# Patient Record
Sex: MALE | Race: WHITE
[De-identification: names, ages, dates, MRNs, and addresses within clinical notes are randomized per-mention and may not be internally consistent; named-entity substitution may affect disease eponyms.]

---

## 2018-06-07 ENCOUNTER — HOSPITAL ENCOUNTER (OUTPATIENT)
Dept: HOSPITAL 47 - RADXRMAIN | Age: 83
Discharge: HOME | End: 2018-06-07
Attending: FAMILY MEDICINE
Payer: MEDICARE

## 2018-06-07 DIAGNOSIS — S49.91XA: Primary | ICD-10-CM

## 2018-06-07 NOTE — XR
EXAMINATION TYPE: XR shoulder complete RT

 

DATE OF EXAM: 6/7/2018

 

COMPARISON: NONE

 

HISTORY: Pain

 

TECHNIQUE:  Shoulder examined in 3 views

 

FINDINGS: 

The humeral head articulates with the glenoid.

The acromio-clavicular junction is normal.

No acute fractures or dislocations are evident.

 

A follow up study can be performed 7-10 days from acute trauma for continued pain.

 

IMPRESSION:

1. Normal right Shoulder .

2. A 0.9 cm nodule may be in the right upper lobe rate follow-up chest study is recommended.

## 2021-01-29 ENCOUNTER — HOSPITAL ENCOUNTER (INPATIENT)
Dept: HOSPITAL 47 - 2SICU | Age: 86
LOS: 9 days | DRG: 871 | End: 2021-02-07
Attending: INTERNAL MEDICINE | Admitting: INTERNAL MEDICINE
Payer: MEDICARE

## 2021-01-29 VITALS — BODY MASS INDEX: 23.8 KG/M2

## 2021-01-29 DIAGNOSIS — A41.89: Primary | ICD-10-CM

## 2021-01-29 DIAGNOSIS — M19.90: ICD-10-CM

## 2021-01-29 DIAGNOSIS — R13.10: ICD-10-CM

## 2021-01-29 DIAGNOSIS — Z86.73: ICD-10-CM

## 2021-01-29 DIAGNOSIS — Z51.5: ICD-10-CM

## 2021-01-29 DIAGNOSIS — Z90.2: ICD-10-CM

## 2021-01-29 DIAGNOSIS — H91.90: ICD-10-CM

## 2021-01-29 DIAGNOSIS — J96.01: ICD-10-CM

## 2021-01-29 DIAGNOSIS — R45.1: ICD-10-CM

## 2021-01-29 DIAGNOSIS — Z87.891: ICD-10-CM

## 2021-01-29 DIAGNOSIS — N17.0: ICD-10-CM

## 2021-01-29 DIAGNOSIS — Z79.82: ICD-10-CM

## 2021-01-29 DIAGNOSIS — G40.909: ICD-10-CM

## 2021-01-29 DIAGNOSIS — Z95.1: ICD-10-CM

## 2021-01-29 DIAGNOSIS — E86.0: ICD-10-CM

## 2021-01-29 DIAGNOSIS — G93.41: ICD-10-CM

## 2021-01-29 DIAGNOSIS — F32.9: ICD-10-CM

## 2021-01-29 DIAGNOSIS — Z79.899: ICD-10-CM

## 2021-01-29 DIAGNOSIS — J12.82: ICD-10-CM

## 2021-01-29 DIAGNOSIS — Z74.01: ICD-10-CM

## 2021-01-29 DIAGNOSIS — Z79.02: ICD-10-CM

## 2021-01-29 DIAGNOSIS — Z78.1: ICD-10-CM

## 2021-01-29 DIAGNOSIS — Z66: ICD-10-CM

## 2021-01-29 DIAGNOSIS — I25.10: ICD-10-CM

## 2021-01-29 DIAGNOSIS — K21.9: ICD-10-CM

## 2021-01-29 DIAGNOSIS — J45.20: ICD-10-CM

## 2021-01-29 DIAGNOSIS — I10: ICD-10-CM

## 2021-01-29 DIAGNOSIS — R19.7: ICD-10-CM

## 2021-01-29 DIAGNOSIS — E78.5: ICD-10-CM

## 2021-01-29 DIAGNOSIS — Z95.5: ICD-10-CM

## 2021-01-29 DIAGNOSIS — Z98.49: ICD-10-CM

## 2021-01-29 DIAGNOSIS — R64: ICD-10-CM

## 2021-01-29 DIAGNOSIS — U07.1: ICD-10-CM

## 2021-01-29 PROCEDURE — 5A0945A ASSISTANCE WITH RESPIRATORY VENTILATION, 24-96 CONSECUTIVE HOURS, HIGH NASAL FLOW/VELOCITY: ICD-10-PCS

## 2021-01-29 PROCEDURE — 85025 COMPLETE CBC W/AUTO DIFF WBC: CPT

## 2021-01-29 PROCEDURE — 83615 LACTATE (LD) (LDH) ENZYME: CPT

## 2021-01-29 PROCEDURE — 83880 ASSAY OF NATRIURETIC PEPTIDE: CPT

## 2021-01-29 PROCEDURE — 86140 C-REACTIVE PROTEIN: CPT

## 2021-01-29 PROCEDURE — 87324 CLOSTRIDIUM AG IA: CPT

## 2021-01-29 PROCEDURE — 94640 AIRWAY INHALATION TREATMENT: CPT

## 2021-01-29 PROCEDURE — 81001 URINALYSIS AUTO W/SCOPE: CPT

## 2021-01-29 PROCEDURE — 82728 ASSAY OF FERRITIN: CPT

## 2021-01-29 PROCEDURE — 85379 FIBRIN DEGRADATION QUANT: CPT

## 2021-01-29 PROCEDURE — 83735 ASSAY OF MAGNESIUM: CPT

## 2021-01-29 PROCEDURE — 80048 BASIC METABOLIC PNL TOTAL CA: CPT

## 2021-01-29 PROCEDURE — 71045 X-RAY EXAM CHEST 1 VIEW: CPT

## 2021-01-30 LAB
ANION GAP SERPL CALC-SCNC: 8 MMOL/L
BASOPHILS # BLD AUTO: 0 K/UL (ref 0–0.2)
BASOPHILS NFR BLD AUTO: 0 %
BUN SERPL-SCNC: 41 MG/DL (ref 9–20)
CALCIUM SPEC-MCNC: 8.4 MG/DL (ref 8.4–10.2)
CHLORIDE SERPL-SCNC: 106 MMOL/L (ref 98–107)
CO2 SERPL-SCNC: 24 MMOL/L (ref 22–30)
EOSINOPHIL # BLD AUTO: 0 K/UL (ref 0–0.7)
EOSINOPHIL NFR BLD AUTO: 0 %
ERYTHROCYTE [DISTWIDTH] IN BLOOD BY AUTOMATED COUNT: 4.19 M/UL (ref 4.3–5.9)
ERYTHROCYTE [DISTWIDTH] IN BLOOD: 13.7 % (ref 11.5–15.5)
GLUCOSE SERPL-MCNC: 185 MG/DL (ref 74–99)
HCT VFR BLD AUTO: 37 % (ref 39–53)
HGB BLD-MCNC: 12 GM/DL (ref 13–17.5)
LDH SPEC-CCNC: 1229 U/L (ref 313–618)
LYMPHOCYTES # SPEC AUTO: 0.7 K/UL (ref 1–4.8)
LYMPHOCYTES NFR SPEC AUTO: 7 %
MAGNESIUM SPEC-SCNC: 2.8 MG/DL (ref 1.6–2.3)
MCH RBC QN AUTO: 28.7 PG (ref 25–35)
MCHC RBC AUTO-ENTMCNC: 32.6 G/DL (ref 31–37)
MCV RBC AUTO: 88.2 FL (ref 80–100)
MONOCYTES # BLD AUTO: 0.5 K/UL (ref 0–1)
MONOCYTES NFR BLD AUTO: 5 %
NEUTROPHILS # BLD AUTO: 8.4 K/UL (ref 1.3–7.7)
NEUTROPHILS NFR BLD AUTO: 86 %
PH UR: 6 [PH] (ref 5–8)
PLATELET # BLD AUTO: 236 K/UL (ref 150–450)
POTASSIUM SERPL-SCNC: 5 MMOL/L (ref 3.5–5.1)
RBC UR QL: 15 /HPF (ref 0–5)
SODIUM SERPL-SCNC: 138 MMOL/L (ref 137–145)
SP GR UR: 1.01 (ref 1–1.03)
UROBILINOGEN UR QL STRIP: <2 MG/DL (ref ?–2)
WBC # BLD AUTO: 9.9 K/UL (ref 3.8–10.6)
WBC # UR AUTO: <1 /HPF (ref 0–5)

## 2021-01-30 RX ADMIN — TAMSULOSIN HYDROCHLORIDE SCH MG: 0.4 CAPSULE ORAL at 10:10

## 2021-01-30 RX ADMIN — ALBUTEROL SULFATE PRN PUFF: 90 AEROSOL, METERED RESPIRATORY (INHALATION) at 19:49

## 2021-01-30 RX ADMIN — CLOPIDOGREL BISULFATE SCH MG: 75 TABLET ORAL at 10:10

## 2021-01-30 RX ADMIN — ENOXAPARIN SODIUM SCH MG: 40 INJECTION SUBCUTANEOUS at 08:30

## 2021-01-30 RX ADMIN — ISOSORBIDE MONONITRATE SCH MG: 30 TABLET, EXTENDED RELEASE ORAL at 10:09

## 2021-01-30 RX ADMIN — ASPIRIN 81 MG CHEWABLE TABLET SCH MG: 81 TABLET CHEWABLE at 10:10

## 2021-01-30 RX ADMIN — COLCHICINE SCH MG: 0.6 TABLET, FILM COATED ORAL at 08:31

## 2021-01-30 RX ADMIN — VENLAFAXINE HYDROCHLORIDE SCH MG: 150 CAPSULE, EXTENDED RELEASE ORAL at 10:09

## 2021-01-30 RX ADMIN — Medication SCH MG: at 10:10

## 2021-01-30 NOTE — P.HPIM
History of Present Illness


86-year-old the vanessa gentleman was transferred from [facility] for Covid 19 and treatment with Actemra, which is unfortunately not 

available at this time and this hospital.  Patient doesn't have any more fevers 

patient is on 15 L of high flow nasal cannula oxygen patient has bilateral 

rhonchi.  Patient is not a candidate for Remdesivir but patient is on Decadron, 

colchicine, zinc and ascorbic acid.  Patient denied any fever chills nausea 

vomiting abdominal pain dysuria.





Review of Systems








REVIEW OF SYSTEMS: 


CONSTITUTIONAL: No fever, no malaise, no fatigue. 


HEENT: No recent visual problems or hearing problems. Denied any sore throat. 


CARDIOVASCULAR: No chest pain, orthopnea, PND, no palpitations, no syncope. 


PULMONARY: no hemoptysis. 


GASTROINTESTINAL: No diarrhea, no nausea, no vomiting, no abdominal pain. 


NEUROLOGICAL: No headaches, no weakness, no numbness. 


HEMATOLOGICAL: Denies any bleeding or petechiae. 


GENITOURINARY: Denies any burning micturition, frequency, or urgency. 


MUSCULOSKELETAL/RHEUMATOLOGICAL: Denies any joint pain, swelling, or any muscle 

pain. 


ENDOCRINE: Denies any polyuria or polydipsia. 





The rest of the 14-point review of systems is negative.











Past Medical History


Past Medical History: Coronary Artery Disease (CAD), CVA/TIA, GERD/Reflux, 

Hearing Disorder / Deafness, Hyperlipidemia, Hypertension, Osteoarthritis (OA)


History of Any Multi-Drug Resistant Organisms: None Reported


Past Surgical History: Coronary Bypass/CABG, Heart Catheterization With Stent, 

Hernia Repair


Additional Past Surgical History / Comment(s): R lung lobectomy (1978), 

Cataracts, Colonoscopy with polyp removal, Angioplasty with stent (1996,2005), 

Brain surgery (2012)


Date of Last Stent Placement:: 2005


Smoking Status: Former smoker





Medications and Allergies


                                Home Medications











 Medication  Instructions  Recorded  Confirmed  Type


 


Acetaminophen Tab [Tylenol] 650 mg PO Q4H PRN 01/29/21 01/29/21 History


 


Albuterol Inhaler [Ventolin Hfa 2 puff INHALATION RT-Q4H PRN 01/29/21 01/29/21 

History





Inhaler]    


 


Ascorbic Acid [Vitamin C] 2,000 mg PO DAILY 01/29/21 01/29/21 History


 


Aspirin EC [Ecotrin Low Dose] 81 mg PO DAILY 01/29/21 01/29/21 History


 


Atorvastatin Calcium [Lipitor] 10 mg PO HS 01/29/21 01/29/21 History


 


Cholecalciferol [Vitamin D3 (25 100 mcg PO DAILY 01/29/21 01/29/21 History





Mcg = 1000 Iu)]    


 


Clopidogrel Bisulfate [Plavix] 75 mg PO DAILY 01/29/21 01/29/21 History


 


Colchicine 0.6 mg PO BID 01/29/21 01/29/21 History


 


Enoxaparin [Lovenox] 40 mg SQ DAILY 01/29/21 01/29/21 History


 


Ezetimibe [Zetia] 10 mg PO HS 01/29/21 01/29/21 History


 


Famotidine [Pepcid] 20 mg PO DAILY 01/29/21 01/29/21 History


 


Isosorbide Mononitrate ER [Imdur] 90 mg PO DAILY 01/29/21 01/29/21 History


 


Melatonin 5 mg PO HS 01/29/21 01/29/21 History


 


Montelukast [Singulair] 10 mg PO HS 01/29/21 01/29/21 History


 


Sodium Chloride [Saline Nasal 1 spray EA NOSTRIL BID 01/29/21 01/29/21 History





Spray]    


 


Tamsulosin [Flomax] 0.4 mg PO BID 01/29/21 01/29/21 History


 


Thiamine [Vitamin B-1] 100 mg PO BID 01/29/21 01/29/21 History


 


Venlafaxine HCl ER [Effexor Xr] 150 mg PO DAILY 01/29/21 01/29/21 History


 


Zinc Sulfate 220 mg PO AC-BRKFST 01/29/21 01/29/21 History


 


carvediloL [Coreg] 3.125 mg PO BID 01/29/21 01/29/21 History


 


levETIRAcetam [Keppra] 500 mg PO Q12H 01/29/21 01/29/21 History


 


lisinopriL [Zestril] 5 mg PO DAILY 01/29/21 01/29/21 History








                                    Allergies











Allergy/AdvReac Type Severity Reaction Status Date / Time


 


No Known Allergies Allergy   Unverified 01/29/21 21:05














Physical Exam


Vitals: 


                                   Vital Signs











  Temp Pulse Resp BP BP Pulse Ox


 


 01/30/21 08:00  98 F  51 L  22  155/66   98


 


 01/30/21 07:00   57 L  24  156/65   93 L


 


 01/30/21 06:00   50 L  36 H  136/55   96


 


 01/30/21 05:00   59 L  17  155/74   94 L


 


 01/30/21 04:00  98.8 F  57 L  23  139/54   97


 


 01/30/21 03:29       97


 


 01/30/21 03:00   51 L  22  148/60   97


 


 01/30/21 02:00   49 L  28 H  139/64   95


 


 01/30/21 01:00   54 L  13    91 L


 


 01/30/21 00:00  97.7 F  66  48 H  154/66   97


 


 01/29/21 23:00   59 L  25 H    88 L


 


 01/29/21 22:20   61  33 H  143/57   80 L


 


 01/29/21 20:36       95


 


 01/29/21 20:35  98.3 F   15   154/66  92 L








                                Intake and Output











 01/29/21 01/30/21 01/30/21





 22:59 06:59 14:59


 


Intake Total 0 0 0


 


Output Total  550 300


 


Balance 0 -550 -300


 


Intake:   


 


  IV 0 0 0


 


    0.9 0 0 0


 


Output:   


 


  Urine  550 300


 


Other:   


 


  Weight 72.6 kg 72.6 kg 

















PHYSICAL EXAMINATION: 





GENERAL: The patient is alert and oriented x3, not in any acute distress. Well 

developed, well nourished. 


HEENT: Pupils are round and equally reacting to light. EOMI. No scleral icterus.

 No conjunctival pallor. Normocephalic, atraumatic. No pharyngeal erythema. No 

thyromegaly. 


CARDIOVASCULAR: S1 and S2 present. No murmurs, rubs, or gallops. 


PULMONARY: Diffuse bilateral rhonchi no wheezing was appreciated.


ABDOMEN: Soft, nontender, nondistended, normoactive bowel sounds. No palpable 

organomegaly. 


MUSCULOSKELETAL: No joint swelling or deformity.


EXTREMITIES: No cyanosis, clubbing, or pedal edema. 


NEUROLOGICAL: Gross neurological examination did not reveal any focal deficits. 


SKIN: No rashes. 

















Results


CBC & Chem 7: 


                                 01/30/21 03:45





                                 01/30/21 03:45


Labs: 


                  Abnormal Lab Results - Last 24 Hours (Table)











  01/30/21 01/30/21 01/30/21 Range/Units





  03:45 03:45 03:45 


 


RBC  4.19 L    (4.30-5.90)  m/uL


 


Hgb  12.0 L    (13.0-17.5)  gm/dL


 


Hct  37.0 L    (39.0-53.0)  %


 


Neutrophils #  8.4 H    (1.3-7.7)  k/uL


 


Lymphocytes #  0.7 L    (1.0-4.8)  k/uL


 


D-Dimer     (<0.60)  mg/L FEU


 


BUN   41 H   (9-20)  mg/dL


 


Glucose   185 H   (74-99)  mg/dL


 


Magnesium   2.8 H   (1.6-2.3)  mg/dL


 


Lactate Dehydrogenase    1229 H  (313-618)  U/L


 


C-Reactive Protein    27.0 H  (<10.0)  mg/L














  01/30/21 Range/Units





  03:45 


 


RBC   (4.30-5.90)  m/uL


 


Hgb   (13.0-17.5)  gm/dL


 


Hct   (39.0-53.0)  %


 


Neutrophils #   (1.3-7.7)  k/uL


 


Lymphocytes #   (1.0-4.8)  k/uL


 


D-Dimer  1.06 H  (<0.60)  mg/L FEU


 


BUN   (9-20)  mg/dL


 


Glucose   (74-99)  mg/dL


 


Magnesium   (1.6-2.3)  mg/dL


 


Lactate Dehydrogenase   (313-618)  U/L


 


C-Reactive Protein   (<10.0)  mg/L














Thrombosis Risk Factor Assmnt





- Choose All That Apply


Each Risk Factor Represents 3 Points: Age 75 years or older


Thrombosis Risk Factor Assessment Total Risk Factor Score: 3


Thrombosis Risk Factor Assessment Level: Moderate Risk





Assessment and Plan


Plan: 


-Acute respiratory failure secondary to covid 19 pneumonia and patient continue 

with the above-mentioned medications that his Decadron, colchicine, zinc oxide. 

 Supportive care continue with oxygen as needed.  Monitoring.  Arcus.


-Coronary artery disease status post coronary artery bypass grafting


-CVA/TIA in the past 


- gastroesophageal reflux disease and stent-hyperlipidemia


-Hypertension


-Hyperlipidemia


Depression.


-Seizure disorder for which patient and Keppra which will be continued


-DVT prophylaxis with Lovenox

## 2021-01-30 NOTE — P.CNPUL
History of Present Illness


Consult date: 01/30/21


Requesting physician: Thais Hayward


Reason for consult: other (Acute hypoxic respiratory failure secondary to covid 

19 pneumonitis.)


Chief complaint: Shortness of breath


History of present illness: 





This is a 86-year-old white male with history of multiple medical problems 

including benign essential hypertension, coronary artery disease, GERD, obesity,

chronic degenerative joint disease, patient had been sick since January 18 2021.

 Symptoms were mostly symptoms of weakness, no energy, on January 19, patient 

was diagnosed with positive Covid  19 infection..  Patient was admitted to [facility] few days ago, and he was seen by Dr. cortes on consultation.  

He felt the patient was out of the window for remdesivir, and he recommended 

that the patient gets at least actemra .  Patient was seen by infectious disease

on consultation at [facility], and he was started on the Covid 19 

cocktail along with remdesivir.  However based on the recommendation by Dr. Cortes, arrangements were made to transfer the patient to [facility] late last night, hoping to receive actemra, patient is presently on 15 

L high flow nasal cannula.  Feels comfortable, in no distress, denies any cough,

no wheezing, denies even shortness of breath.  Denies any headache, no nausea no

vomiting, no loss of sensation of taste or smell.  Patient was transferred to 

the ICU mostly because he is requiring a significant amount of oxygen, and his 

chest x-ray clearly shows evidence of diffuse interstitial pneumonitis.  Left 

more so than right.  His CBC is relatively normal his d-dimer is 1.06.  His 

electrolytes are normal his LDH is 1229, and C-reactive protein is 27





Review of Systems


CONSTITUTIONAL: No fever, no malaise, no fatigue. 


HEENT: Negative


CARDIOVASCULAR: No chest pain, orthopnea, PND, no palpitations, no syncope. 


PULMONARY: As noted in HPI.


GASTROINTESTINAL: No diarrhea, no nausea, no vomiting, no abdominal pain. 


NEUROLOGICAL: No headaches, no weakness, no numbness. 


HEMATOLOGICAL: Denies any bleeding or petechiae. 


GENITOURINARY: Denies any burning micturition, frequency, or urgency. 


MUSCULOSKELETAL/RHEUMATOLOGICAL: Denies any joint pain, swelling, or any muscle 

pain. 


ENDOCRINE: Denies any polyuria or polydipsia. 


Skin: Negative


Psychiatric: Negative











Past Medical History


Past Medical History: Coronary Artery Disease (CAD), CVA/TIA, GERD/Reflux, 

Hearing Disorder / Deafness, Hyperlipidemia, Hypertension, Osteoarthritis (OA)


History of Any Multi-Drug Resistant Organisms: None Reported


Past Surgical History: Coronary Bypass/CABG, Heart Catheterization With Stent, 

Hernia Repair


Additional Past Surgical History / Comment(s): R lung lobectomy (1978), 

Cataracts, Colonoscopy with polyp removal, Angioplasty with stent (1996,2005), 

Brain surgery (2012)


Date of Last Stent Placement:: 2005


Smoking Status: Former smoker





Medications and Allergies


                                Home Medications











 Medication  Instructions  Recorded  Confirmed  Type


 


Acetaminophen Tab [Tylenol] 650 mg PO Q4H PRN 01/29/21 01/29/21 History


 


Albuterol Inhaler [Ventolin Hfa 2 puff INHALATION RT-Q4H PRN 01/29/21 01/29/21 

History





Inhaler]    


 


Ascorbic Acid [Vitamin C] 2,000 mg PO DAILY 01/29/21 01/29/21 History


 


Aspirin EC [Ecotrin Low Dose] 81 mg PO DAILY 01/29/21 01/29/21 History


 


Atorvastatin Calcium [Lipitor] 10 mg PO HS 01/29/21 01/29/21 History


 


Cholecalciferol [Vitamin D3 (25 100 mcg PO DAILY 01/29/21 01/29/21 History





Mcg = 1000 Iu)]    


 


Clopidogrel Bisulfate [Plavix] 75 mg PO DAILY 01/29/21 01/29/21 History


 


Colchicine 0.6 mg PO BID 01/29/21 01/29/21 History


 


Enoxaparin [Lovenox] 40 mg SQ DAILY 01/29/21 01/29/21 History


 


Ezetimibe [Zetia] 10 mg PO HS 01/29/21 01/29/21 History


 


Famotidine [Pepcid] 20 mg PO DAILY 01/29/21 01/29/21 History


 


Isosorbide Mononitrate ER [Imdur] 90 mg PO DAILY 01/29/21 01/29/21 History


 


Melatonin 5 mg PO HS 01/29/21 01/29/21 History


 


Montelukast [Singulair] 10 mg PO HS 01/29/21 01/29/21 History


 


Sodium Chloride [Saline Nasal 1 spray EA NOSTRIL BID 01/29/21 01/29/21 History





Spray]    


 


Tamsulosin [Flomax] 0.4 mg PO BID 01/29/21 01/29/21 History


 


Thiamine [Vitamin B-1] 100 mg PO BID 01/29/21 01/29/21 History


 


Venlafaxine HCl ER [Effexor Xr] 150 mg PO DAILY 01/29/21 01/29/21 History


 


Zinc Sulfate 220 mg PO AC-BRKFST 01/29/21 01/29/21 History


 


carvediloL [Coreg] 3.125 mg PO BID 01/29/21 01/29/21 History


 


levETIRAcetam [Keppra] 500 mg PO Q12H 01/29/21 01/29/21 History


 


lisinopriL [Zestril] 5 mg PO DAILY 01/29/21 01/29/21 History








                                    Allergies











Allergy/AdvReac Type Severity Reaction Status Date / Time


 


No Known Allergies Allergy   Unverified 01/29/21 21:05














Physical Exam


Vitals: 


                                   Vital Signs











  Temp Pulse Resp BP BP Pulse Ox


 


 01/30/21 11:00   57 L  33 H  163/72   93 L


 


 01/30/21 10:00   54 L  16  170/62   92 L


 


 01/30/21 09:00   56 L  24  178/68   93 L


 


 01/30/21 08:00  98 F  51 L  22  155/66   98


 


 01/30/21 07:00   57 L  24  156/65   93 L


 


 01/30/21 06:00   50 L  36 H  136/55   96


 


 01/30/21 05:00   59 L  17  155/74   94 L


 


 01/30/21 04:00  98.8 F  57 L  23  139/54   97


 


 01/30/21 03:29       97


 


 01/30/21 03:00   51 L  22  148/60   97


 


 01/30/21 02:00   49 L  28 H  139/64   95


 


 01/30/21 01:00   54 L  13    91 L


 


 01/30/21 00:00  97.7 F  66  48 H  154/66   97


 


 01/29/21 23:00   59 L  25 H    88 L


 


 01/29/21 22:20   61  33 H  143/57   80 L


 


 01/29/21 20:36       95


 


 01/29/21 20:35  98.3 F   15   154/66  92 L








                                Intake and Output











 01/29/21 01/30/21 01/30/21





 22:59 06:59 14:59


 


Intake Total 0 0 0


 


Output Total  550 1325


 


Balance 0 -550 -1325


 


Intake:   


 


  IV 0 0 0


 


    0.9 0 0 0


 


Output:   


 


  Urine  550 1325


 


Other:   


 


  Voiding Method   Indwelling Catheter


 


  Weight 72.6 kg 72.6 kg 














Physical Exam: Revealed 86-year-old white male in no distress, on 15 L high flow

nasal cannula.


Head: Atraumatic, normocephalic.


HEENT:[Neck is supple.] [No neck masses.] [No thyromegaly.] [No JVD.]


Chest: [Symmetrical chest expansion, crackles at the bases.  No rhonchi and no 

wheezes.


Cardiac Exam: [Normal S1 and S2, no S3 gallop, no murmur.]


Abdomen: [Soft, nontender,  no megaly, no rebound, no guarding, normal bowel 

sounds.]


Extremities: [No clubbing, no edema, no cyanosis.]


Neurological Exam: [No focal neurologic deficit.]  Alert oriented 3, hard of 

hearing.


Psychiatric: Normal mood affect and normal mental status examination.


Skin: No rashes.





Results





- Laboratory Findings


CBC and BMP: 


                                 01/30/21 03:45





                                 01/30/21 03:45


PT/INR, D-dimer











D-Dimer  1.06 mg/L FEU (<0.60)  H  01/30/21  03:45    








Abnormal lab findings: 


                                  Abnormal Labs











  01/30/21 01/30/21 01/30/21





  03:45 03:45 03:45


 


RBC  4.19 L  


 


Hgb  12.0 L  


 


Hct  37.0 L  


 


Neutrophils #  8.4 H  


 


Lymphocytes #  0.7 L  


 


D-Dimer   


 


BUN   41 H 


 


Glucose   185 H 


 


Magnesium   2.8 H 


 


Lactate Dehydrogenase    1229 H


 


C-Reactive Protein    27.0 H


 


Urine Blood   


 


Urine RBC   


 


Urine Bacteria   


 


Urine Mucus   














  01/30/21 01/30/21





  03:45 09:25


 


RBC  


 


Hgb  


 


Hct  


 


Neutrophils #  


 


Lymphocytes #  


 


D-Dimer  1.06 H 


 


BUN  


 


Glucose  


 


Magnesium  


 


Lactate Dehydrogenase  


 


C-Reactive Protein  


 


Urine Blood   Moderate H


 


Urine RBC   15 H


 


Urine Bacteria   Rare H


 


Urine Mucus   Rare H














- Diagnostic Findings


Chest x-ray: image reviewed (As noted in HPI.)





Assessment and Plan


Assessment: 





Impression:


Acute hypoxic respiratory failure secondary to acute covid 19 pneumonitis.


History of TIA.


History of coronary artery disease and previous CABG.


History of degenerative joint disease.


Dyslipidemia.


GERD without esophagitis.





Recommendation:


Continue present supportive care measures.


Continue droplet isolation precautions.


Titrate oxygen and maintaining O2 saturation above 90%.


Infectious disease to see her on consultation.


Continue the Covid 19 cocktail.


Added actemra, 2 doses, 12 hours apart.


Resume home meds.


GI and DVT prophylaxis.


Added colchicine.


We will continue to follow


Prognosis is definitely guarded


Time with Patient: Greater than 30

## 2021-01-30 NOTE — XR
EXAMINATION TYPE: XR chest 1V

 

DATE OF EXAM: 1/30/2021

 

COMPARISON: NONE

 

HISTORY: Covid pneumonia

 

TECHNIQUE: Single frontal view of the chest is obtained.

 

FINDINGS:  Patient is rotated, exam is expiratory. Patient is post median sternotomy and the right he
midiaphragm is elevated. Bilateral airspace disease is noted. No evident pneumothorax. Difficult to e
xclude effusion. Cardiac mediastinal silhouette is within normal limits accounting for rotation, tech
nique.

 

IMPRESSION:  Correlate for pneumonia, edema

## 2021-01-30 NOTE — CONS
CONSULTATION



DATE OF SERVICE:

01/30/2021.



REASON FOR CONSULTATION:

COVID-19 pneumonia



HISTORY OF PRESENT ILLNESS:

The patient is an 86-year-old  male who initially presented to [facility] with increasing shortness of breath and cough.  The patient's symptoms have

been going on for more than 10 days and was previously diagnosed with COVID-19

infection.  The patient, while in that facility was noticed to have significant

worsening and 24 hours with his O2 requirement jumping up to 15 L and x-rays did shows

significant worsening of infiltrate with concern for the cytochrome [place].  The patient

was supposed to go to [facility] for Actemra infusion.  However, the

patient has been transferred to [facility].  The patient was brought

in last night.  Actemra has been ordered.  Unfortunately per the nursing staff there is

none available at this facility.  The patient is currently hemodynamically stable on

pressor support.  He is currently 98% on 15 L high-flow oxygen.  No vomiting or

diarrhea or any other changes reported by the nursing staff.  Patient himself is hard

of hearing and not a good historian.



REVIEW OF SYSTEMS:

Positive points have been mentioned in HPI. Other  systems are negative.



PAST MEDICAL HISTORY:

Coronary artery disease, gastroesophageal reflux disease, hypertension, hyperlipidemia.



PAST SURGICAL HISTORY:

Coronary artery bypass grafting, right lobectomy, cataract surgery.



SOCIAL HISTORY:

Remote history of smoking.  No drinking, drug use.



FAMILY HISTORY:

No pertinent findings noticed.



ALLERGIES:

No known drug allergies.



MEDICATIONS:

The patient is currently on Tylenol, vitamin C, aspirin, Lipitor, Coreg, vitamin D3,

Plavix, colchicine, dexamethasone, Lovenox, Pepcid, Imdur, Keppra, Zestril, Melatonin,

_____Effexor and zinc sulfate.



PHYSICAL EXAMINATION:

On examination, blood pressure 132/65 with a pulse of 64, temperature 98.  He is 90% on

15 L high-flow oxygen.

General description is an elderly male lying in bed in no distress.

HEENT examination: No pallor. No scleral icterus.

LUNGS: Unlabored breathing, decreased intensity in breath sounds.  No wheeze.

HEART S1, S2.  Regular rate and rhythm.

ABDOMEN:  Soft, no tenderness.

EXTREMITIES: No edema of the feet.



LABS:

Hemoglobin is 12.7, white count 9.9, BUN of 41, creatinine 0.89.  Urine has been

negative.  Chest x-ray with multifocal infiltrate.



DIAGNOSTIC IMPRESSION AND PLAN:

Patient admitted to the hospital with acute COVID-19 pneumonia in this patient

currently out of the therapeutic window for the Remdesivir with progressive worsening

of his infiltrate and concern for possible _____ the patient has been transferred out

from [facility] with hope of getting Actemra that may help this patient which

unfortunately not available at this facility.  Currently with no evidence of any

secondary bacterial pneumonia.



PLAN:

1. Recommend higher dose of steroids to help with inflammatory process.  We will

    switch him over to Solu-Medrol 60 q.6h.

2. If Actemra unavailable at this facility, recommend transferring the patient to [facility] where the patient will be able to receive this medication.

3. Continue with vitamin C, zinc, Lovenox.

4. We will follow on his clinical condition and further adjust medication if needed.

Thank you for this consultation. We will follow this patient along with you.





LILLY / DENTONN: [id number] / Job#: [id number]

## 2021-01-31 LAB
ANION GAP SERPL CALC-SCNC: 7 MMOL/L
BASOPHILS # BLD AUTO: 0.1 K/UL (ref 0–0.2)
BASOPHILS NFR BLD AUTO: 0 %
BUN SERPL-SCNC: 49 MG/DL (ref 9–20)
CALCIUM SPEC-MCNC: 8.7 MG/DL (ref 8.4–10.2)
CHLORIDE SERPL-SCNC: 106 MMOL/L (ref 98–107)
CO2 SERPL-SCNC: 26 MMOL/L (ref 22–30)
EOSINOPHIL # BLD AUTO: 0 K/UL (ref 0–0.7)
EOSINOPHIL NFR BLD AUTO: 0 %
ERYTHROCYTE [DISTWIDTH] IN BLOOD BY AUTOMATED COUNT: 4.58 M/UL (ref 4.3–5.9)
ERYTHROCYTE [DISTWIDTH] IN BLOOD: 13.5 % (ref 11.5–15.5)
GLUCOSE SERPL-MCNC: 162 MG/DL (ref 74–99)
HCT VFR BLD AUTO: 40.7 % (ref 39–53)
HGB BLD-MCNC: 13 GM/DL (ref 13–17.5)
LDH SPEC-CCNC: 1325 U/L (ref 313–618)
LYMPHOCYTES # SPEC AUTO: 0.8 K/UL (ref 1–4.8)
LYMPHOCYTES NFR SPEC AUTO: 7 %
MAGNESIUM SPEC-SCNC: 2.8 MG/DL (ref 1.6–2.3)
MCH RBC QN AUTO: 28.4 PG (ref 25–35)
MCHC RBC AUTO-ENTMCNC: 32 G/DL (ref 31–37)
MCV RBC AUTO: 88.8 FL (ref 80–100)
MONOCYTES # BLD AUTO: 0.9 K/UL (ref 0–1)
MONOCYTES NFR BLD AUTO: 8 %
NEUTROPHILS # BLD AUTO: 9.2 K/UL (ref 1.3–7.7)
NEUTROPHILS NFR BLD AUTO: 82 %
PLATELET # BLD AUTO: 258 K/UL (ref 150–450)
POTASSIUM SERPL-SCNC: 4.6 MMOL/L (ref 3.5–5.1)
SODIUM SERPL-SCNC: 139 MMOL/L (ref 137–145)
WBC # BLD AUTO: 11.2 K/UL (ref 3.8–10.6)

## 2021-01-31 RX ADMIN — Medication SCH MG: at 21:59

## 2021-01-31 RX ADMIN — Medication SCH MG: at 08:45

## 2021-01-31 RX ADMIN — ATORVASTATIN CALCIUM SCH MG: 10 TABLET, FILM COATED ORAL at 21:59

## 2021-01-31 RX ADMIN — Medication SCH MG: at 03:40

## 2021-01-31 RX ADMIN — Medication SCH MCG: at 08:45

## 2021-01-31 RX ADMIN — METHYLPREDNISOLONE SODIUM SUCCINATE SCH: 125 INJECTION, POWDER, FOR SOLUTION INTRAMUSCULAR; INTRAVENOUS at 15:36

## 2021-01-31 RX ADMIN — ISOSORBIDE MONONITRATE SCH MG: 30 TABLET, EXTENDED RELEASE ORAL at 08:44

## 2021-01-31 RX ADMIN — ALBUTEROL SULFATE PRN PUFF: 90 AEROSOL, METERED RESPIRATORY (INHALATION) at 19:49

## 2021-01-31 RX ADMIN — Medication SCH MG: at 08:44

## 2021-01-31 RX ADMIN — CLOPIDOGREL BISULFATE SCH MG: 75 TABLET ORAL at 08:44

## 2021-01-31 RX ADMIN — ALBUTEROL SULFATE PRN PUFF: 90 AEROSOL, METERED RESPIRATORY (INHALATION) at 15:29

## 2021-01-31 RX ADMIN — METHYLPREDNISOLONE SODIUM SUCCINATE SCH: 125 INJECTION, POWDER, FOR SOLUTION INTRAMUSCULAR; INTRAVENOUS at 07:07

## 2021-01-31 RX ADMIN — COLCHICINE SCH MG: 0.6 TABLET, FILM COATED ORAL at 08:45

## 2021-01-31 RX ADMIN — METHYLPREDNISOLONE SODIUM SUCCINATE SCH MG: 125 INJECTION, POWDER, FOR SOLUTION INTRAMUSCULAR; INTRAVENOUS at 03:41

## 2021-01-31 RX ADMIN — MONTELUKAST SODIUM SCH MG: 10 TABLET, FILM COATED ORAL at 03:40

## 2021-01-31 RX ADMIN — EZETIMIBE SCH: 10 TABLET ORAL at 03:15

## 2021-01-31 RX ADMIN — TAMSULOSIN HYDROCHLORIDE SCH MG: 0.4 CAPSULE ORAL at 08:44

## 2021-01-31 RX ADMIN — OXYCODONE HYDROCHLORIDE AND ACETAMINOPHEN SCH MG: 500 TABLET ORAL at 08:44

## 2021-01-31 RX ADMIN — ATORVASTATIN CALCIUM SCH: 10 TABLET, FILM COATED ORAL at 03:15

## 2021-01-31 RX ADMIN — COLCHICINE SCH MG: 0.6 TABLET, FILM COATED ORAL at 03:41

## 2021-01-31 RX ADMIN — ENOXAPARIN SODIUM SCH MG: 40 INJECTION SUBCUTANEOUS at 08:45

## 2021-01-31 RX ADMIN — ALBUTEROL SULFATE PRN PUFF: 90 AEROSOL, METERED RESPIRATORY (INHALATION) at 11:34

## 2021-01-31 RX ADMIN — METHYLPREDNISOLONE SODIUM SUCCINATE SCH MG: 125 INJECTION, POWDER, FOR SOLUTION INTRAMUSCULAR; INTRAVENOUS at 17:14

## 2021-01-31 RX ADMIN — Medication SCH: at 03:15

## 2021-01-31 RX ADMIN — EZETIMIBE SCH MG: 10 TABLET ORAL at 22:01

## 2021-01-31 RX ADMIN — TAMSULOSIN HYDROCHLORIDE SCH MG: 0.4 CAPSULE ORAL at 21:59

## 2021-01-31 RX ADMIN — TAMSULOSIN HYDROCHLORIDE SCH MG: 0.4 CAPSULE ORAL at 03:40

## 2021-01-31 RX ADMIN — MONTELUKAST SODIUM SCH MG: 10 TABLET, FILM COATED ORAL at 21:59

## 2021-01-31 RX ADMIN — FAMOTIDINE SCH MG: 20 TABLET, FILM COATED ORAL at 08:44

## 2021-01-31 RX ADMIN — ASPIRIN 81 MG CHEWABLE TABLET SCH MG: 81 TABLET CHEWABLE at 08:44

## 2021-01-31 RX ADMIN — VENLAFAXINE HYDROCHLORIDE SCH MG: 150 CAPSULE, EXTENDED RELEASE ORAL at 08:45

## 2021-01-31 NOTE — XR
EXAMINATION TYPE: XR chest 1V portable

 

DATE OF EXAM: 1/31/2021

 

COMPARISON: Chest x-ray 1/30/2021

 

HISTORY: Covid pneumonia

 

TECHNIQUE: Single frontal view of the chest is obtained.

 

FINDINGS:  Bilateral interstitial and airspace disease is present. No evident pneumothorax or pleural
 effusion. Right hemidiaphragm is again elevated. Aorta is dense. Patient is post median sternotomy.

 

IMPRESSION:  Findings consistent with patient's history of pneumonia.

## 2021-01-31 NOTE — PN
PROGRESS NOTE



DATE OF SERVICE:

01/31/2021



REASON FOR FOLLOW UP:

Covid 19 infection.



INTERVAL HISTORY:

Patient is currently afebrile.  The patient is hemodynamically stable.  The patient

still requiring high-flow nasal cannula oxygen.  Unable to get his Actemra as of today.

Denies any chest pain now.  He did have a congested cough, not bringing up any sputum.

No abdominal pain. No diarrhea.



PHYSICAL EXAMINATION:

Blood pressure 136/67, pulse of 64, temperature 98.6.  He is 93% on 3 L nasal cannula.

General description is an elderly male lying in bed in no distress.

Respiratory system: Unlabored breathing.  Coarse breath sounds bilaterally. No wheeze.

Heart S1, S2.  Regular rate and rhythm.

ABDOMEN: Soft. No tenderness.



LABS:

Hemoglobin is 13.5, white count 11.2, BUN of 49, creatinine 1.04.



DIAGNOSTIC IMPRESSION AND PLAN:

Patient with acute COVID-19 infection, concern for _____.  The patient will benefit

from Actemra, trying to possibly getting it arranged. Currently covered with Solu-

Medrol, Lovenox, and zinc to continue along with respiratory support.  No evidence of

any secondary bacterial infection hence no need for systemic antibiotic therapy.





MMODL / IJN: [id number] / Job#: [id number]

## 2021-01-31 NOTE — P.PN
Subjective





86-year-old the vanessa gentleman was transferred from [facility] for Covid 19 and treatment with Actemra, which is unfortunately not 

available at this time and this hospital.  Patient doesn't have any more fevers 

patient is on 15 L of high flow nasal cannula oxygen patient has bilateral 

rhonchi.  Patient is not a candidate for Remdesivir but patient is on Decadron, 

colchicine, zinc and ascorbic acid.  Patient denied any fever chills nausea 

vomiting abdominal pain dysuria.








01/31/2021


Patient remains on high flow nasal cannula oxygen.





Constitutional: Denied any fatigue denied any fever.


Cardio vascular: denied any chest pain, palpitations


Gastrointestinal denied any nausea vomiting


Pulmonary: Denied any shortness of breath cough


Neurologic denied any new focal deficits





All inpatient medications were reviewed and appropriate changes in these 

medications as dictated in the interval history and assessment and plan.





Objective





- Vital Signs


Vital signs: 


                                   Vital Signs











Temp  98.2 F   01/31/21 12:00


 


Pulse  66   01/31/21 13:00


 


Resp  22   01/31/21 13:00


 


BP  156/61   01/31/21 13:00


 


Pulse Ox  97   01/31/21 13:00








                                 Intake & Output











 01/30/21 01/31/21 01/31/21





 18:59 06:59 18:59


 


Intake Total 0  


 


Output Total 1700 730 420


 


Balance -1700 -730 -420


 


Weight  71.5 kg 


 


Intake:   


 


  IV 0  


 


    0.9 0  


 


Output:   


 


  Urine 1700 730 420


 


Other:   


 


  Voiding Method Indwelling Catheter Indwelling Catheter Indwelling Catheter














- Exam





PHYSICAL EXAMINATION: 





GENERAL: The patient is alert and oriented x3, not in any acute distress. Well 

developed, well nourished. 


HEENT: Pupils are round and equally reacting to light. EOMI. No scleral icterus.

No conjunctival pallor. Normocephalic, atraumatic. No pharyngeal erythema. No 

thyromegaly. 


CARDIOVASCULAR: S1 and S2 present. No murmurs, rubs, or gallops. 


PULMONARY: Diffuse bilateral rhonchi no wheezing was appreciated.


ABDOMEN: Soft, nontender, nondistended, normoactive bowel sounds. No palpable 

organomegaly. 


MUSCULOSKELETAL: No joint swelling or deformity.


EXTREMITIES: No cyanosis, clubbing, or pedal edema. 


NEUROLOGICAL: Gross neurological examination did not reveal any focal deficits. 


SKIN: No rashes. 





Note: Because of COVID 19 isolation, some of the history and physical exam 

findings are indirect and obtained from nursing staff, and other physician 

examinations to avoid unnecessary contact with the patient.

















- Labs


CBC & Chem 7: 


                                 01/31/21 03:29





                                 01/31/21 03:29


Labs: 


                  Abnormal Lab Results - Last 24 Hours (Table)











  01/31/21 01/31/21 01/31/21 Range/Units





  03:29 03:29 03:29 


 


WBC  11.2 H    (3.8-10.6)  k/uL


 


Neutrophils #  9.2 H    (1.3-7.7)  k/uL


 


Lymphocytes #  0.8 L    (1.0-4.8)  k/uL


 


D-Dimer     (<0.60)  mg/L FEU


 


BUN   49 H   (9-20)  mg/dL


 


Glucose   162 H   (74-99)  mg/dL


 


Magnesium   2.8 H   (1.6-2.3)  mg/dL


 


Lactate Dehydrogenase    1325 H  (313-618)  U/L


 


C-Reactive Protein    13.0 H  (<10.0)  mg/L














  01/31/21 Range/Units





  03:29 


 


WBC   (3.8-10.6)  k/uL


 


Neutrophils #   (1.3-7.7)  k/uL


 


Lymphocytes #   (1.0-4.8)  k/uL


 


D-Dimer  0.72 H  (<0.60)  mg/L FEU


 


BUN   (9-20)  mg/dL


 


Glucose   (74-99)  mg/dL


 


Magnesium   (1.6-2.3)  mg/dL


 


Lactate Dehydrogenase   (313-618)  U/L


 


C-Reactive Protein   (<10.0)  mg/L














Assessment and Plan


Plan: 


-Acute respiratory failure secondary to covid 19 pneumonia and patient continue 

with the above-mentioned medications that his Decadron, colchicine, zinc oxide. 

Supportive care continue with oxygen as needed.  Monitoring.  Arcus.


-Coronary artery disease status post coronary artery bypass grafting


-CVA/TIA in the past 


- gastroesophageal reflux disease and stent-hyperlipidemia


-Hypertension


-Hyperlipidemia


Depression.


-Seizure disorder for which patient and Keppra which will be continued


-DVT prophylaxis with Lovenox

## 2021-01-31 NOTE — P.PN
Subjective


Progress Note Date: 01/31/21


Principal diagnosis: 





Acute hypoxic respiratory failure secondary to Covid 19 pneumonitis.


This is a 86-year-old white male with history of multiple medical problems 

including benign essential hypertension, coronary artery disease, GERD, obesity,

chronic degenerative joint disease, patient had been sick since January 18 2021.

 Symptoms were mostly symptoms of weakness, no energy, on January 19, patient 

was diagnosed with positive Covid  19 infection..  Patient was admitted to [facility] few days ago, and he was seen by Dr. cortes on consultation.  

He felt the patient was out of the window for remdesivir, and he recommended 

that the patient gets at least actemra .  Patient was seen by infectious disease

on consultation at [facility], and he was started on the Covid 19 

cocktail along with remdesivir.  However based on the recommendation by Dr. Nick desir, arrangements were made to transfer the patient to [facility] late last night, hoping to receive actemra, patient is presently on 15 

L high flow nasal cannula.  Feels comfortable, in no distress, denies any cough,

no wheezing, denies even shortness of breath.  Denies any headache, no nausea no

vomiting, no loss of sensation of taste or smell.  Patient was transferred to 

the ICU mostly because he is requiring a significant amount of oxygen, and his 

chest x-ray clearly shows evidence of diffuse interstitial pneumonitis.  Left 

more so than right.  His CBC is relatively normal his d-dimer is 1.06.  His 

electrolytes are normal his LDH is 1229, and C-reactive protein is 27





Reevaluated today on 1/31/2021, patient remains in the ICU, on 15 L high flow 

nasal cannula, O2 saturation is 94%.  Patient did not receive actemra, mostly 

because is not available and hopefully pharmacy will be able to get some today. 

In the meantime the patient remains on the Covid 19 cocktail.  Remains on 

colchicine, remains on steroids, and clinically he seems to be doing fairly 

well.  Patient tells me that he is feeling a bit better, chest x-ray continues 

to show bilateral infiltrates.  And his O2 saturation again is marginal on 15 L 

high flow.  CBC is relatively normal electrolytes are normal BUN is 49 

creatinine is 1.04, patient received 1 dose of Lasix yesterday, he is not on 

maintenance Lasix.  His LDH today is 1325, C-reactive protein is 13.  Not much 

different from yesterday.  Patient is receiving albuterol, vitamin D, Plavix, 

colchicine, Lovenox, Pepcid, melatonin, methylprednisolone 60 mg IV push every 6

hours, zinc, and he is on bronchodilators for his underlying asthma











Objective





- Vital Signs


Vital signs: 


                                   Vital Signs











Temp  98.1 F   01/31/21 08:00


 


Pulse  86   01/31/21 11:00


 


Resp  22   01/31/21 11:00


 


BP  140/71   01/31/21 11:00


 


Pulse Ox  92 L  01/31/21 11:00








                                 Intake & Output











 01/30/21 01/31/21 01/31/21





 18:59 06:59 18:59


 


Intake Total 0  


 


Output Total 1700 730 385


 


Balance -1700 -730 -385


 


Weight  71.5 kg 


 


Intake:   


 


  IV 0  


 


    0.9 0  


 


Output:   


 


  Urine 1700 730 385


 


Other:   


 


  Voiding Method Indwelling Catheter Indwelling Catheter Indwelling Catheter














- Exam





Physical Exam: Revealed 86-year-old white male in no distress, on 15 L high flow

nasal cannula.


Head: Atraumatic, normocephalic.


HEENT:[Neck is supple.] [No neck masses.] [No thyromegaly.] [No JVD.]


Chest: [Symmetrical chest expansion, crackles at the bases.  No rhonchi and no 

wheezes.


Cardiac Exam: [Normal S1 and S2, no S3 gallop, no murmur.]


Abdomen: [Soft, nontender,  no megaly, no rebound, no guarding, normal bowel 

sounds.]


Extremities: [No clubbing, no edema, no cyanosis.]


Neurological Exam: [No focal neurologic deficit.]  Alert oriented 3, hard of 

hearing.


Psychiatric: Normal mood affect and normal mental status examination.


Skin: Areas of ecchymosis and bruising mostly related to blood thinners/chronic











- Labs


CBC & Chem 7: 


                                 01/31/21 03:29





                                 01/31/21 03:29


Labs: 


                  Abnormal Lab Results - Last 24 Hours (Table)











  01/31/21 01/31/21 01/31/21 Range/Units





  03:29 03:29 03:29 


 


WBC  11.2 H    (3.8-10.6)  k/uL


 


Neutrophils #  9.2 H    (1.3-7.7)  k/uL


 


Lymphocytes #  0.8 L    (1.0-4.8)  k/uL


 


D-Dimer     (<0.60)  mg/L FEU


 


BUN   49 H   (9-20)  mg/dL


 


Glucose   162 H   (74-99)  mg/dL


 


Magnesium   2.8 H   (1.6-2.3)  mg/dL


 


Lactate Dehydrogenase    1325 H  (313-618)  U/L


 


C-Reactive Protein    13.0 H  (<10.0)  mg/L














  01/31/21 Range/Units





  03:29 


 


WBC   (3.8-10.6)  k/uL


 


Neutrophils #   (1.3-7.7)  k/uL


 


Lymphocytes #   (1.0-4.8)  k/uL


 


D-Dimer  0.72 H  (<0.60)  mg/L FEU


 


BUN   (9-20)  mg/dL


 


Glucose   (74-99)  mg/dL


 


Magnesium   (1.6-2.3)  mg/dL


 


Lactate Dehydrogenase   (313-618)  U/L


 


C-Reactive Protein   (<10.0)  mg/L














Assessment and Plan


Assessment: 





Impression:


Acute hypoxic respiratory failure secondary to acute covid 19 pneumonitis.


History of TIA.


History of coronary artery disease and previous CABG.


History of degenerative joint disease.


Dyslipidemia.


GERD without esophagitis.


History of mild intermittent asthma.





Recommendation:


Continue to monitor in the ICU since the patient is requiring high flow nasal 

cannula 15 L at this point.


Awaiting pharmacy to get actemra, if possible.


Continue present supportive care measures.


Continue droplet isolation precautions.


Titrate oxygen and maintaining O2 saturation above 90%.


Continue the Covid 19 cocktail.


GI and DVT prophylaxis.


Continue colchicine.


We will continue to follow


Prognosis is definitely guarded


Time with Patient: Less than 30

## 2021-02-01 LAB
ANION GAP SERPL CALC-SCNC: 6 MMOL/L
BUN SERPL-SCNC: 50 MG/DL (ref 9–20)
CALCIUM SPEC-MCNC: 9 MG/DL (ref 8.4–10.2)
CHLORIDE SERPL-SCNC: 108 MMOL/L (ref 98–107)
CO2 SERPL-SCNC: 27 MMOL/L (ref 22–30)
GLUCOSE BLD-MCNC: 193 MG/DL (ref 75–99)
GLUCOSE BLD-MCNC: 202 MG/DL (ref 75–99)
GLUCOSE SERPL-MCNC: 142 MG/DL (ref 74–99)
LDH SPEC-CCNC: 919 U/L (ref 313–618)
POTASSIUM SERPL-SCNC: 4.8 MMOL/L (ref 3.5–5.1)
SODIUM SERPL-SCNC: 141 MMOL/L (ref 137–145)

## 2021-02-01 RX ADMIN — ISOSORBIDE MONONITRATE SCH MG: 30 TABLET, EXTENDED RELEASE ORAL at 08:18

## 2021-02-01 RX ADMIN — Medication SCH MG: at 08:18

## 2021-02-01 RX ADMIN — ENOXAPARIN SODIUM SCH MG: 40 INJECTION SUBCUTANEOUS at 08:18

## 2021-02-01 RX ADMIN — ATORVASTATIN CALCIUM SCH MG: 10 TABLET, FILM COATED ORAL at 20:31

## 2021-02-01 RX ADMIN — TAMSULOSIN HYDROCHLORIDE SCH MG: 0.4 CAPSULE ORAL at 20:31

## 2021-02-01 RX ADMIN — Medication SCH MG: at 08:17

## 2021-02-01 RX ADMIN — Medication SCH MG: at 20:31

## 2021-02-01 RX ADMIN — Medication SCH MCG: at 08:17

## 2021-02-01 RX ADMIN — COLCHICINE SCH: 0.6 TABLET, FILM COATED ORAL at 01:56

## 2021-02-01 RX ADMIN — TAMSULOSIN HYDROCHLORIDE SCH MG: 0.4 CAPSULE ORAL at 08:18

## 2021-02-01 RX ADMIN — FAMOTIDINE SCH MG: 20 TABLET, FILM COATED ORAL at 08:18

## 2021-02-01 RX ADMIN — COLCHICINE SCH MG: 0.6 TABLET, FILM COATED ORAL at 08:19

## 2021-02-01 RX ADMIN — VENLAFAXINE HYDROCHLORIDE SCH MG: 150 CAPSULE, EXTENDED RELEASE ORAL at 08:19

## 2021-02-01 RX ADMIN — CLOPIDOGREL BISULFATE SCH MG: 75 TABLET ORAL at 08:18

## 2021-02-01 RX ADMIN — ASPIRIN 81 MG CHEWABLE TABLET SCH MG: 81 TABLET CHEWABLE at 08:17

## 2021-02-01 RX ADMIN — METHYLPREDNISOLONE SODIUM SUCCINATE SCH MG: 125 INJECTION, POWDER, FOR SOLUTION INTRAMUSCULAR; INTRAVENOUS at 23:08

## 2021-02-01 RX ADMIN — METHYLPREDNISOLONE SODIUM SUCCINATE SCH MG: 125 INJECTION, POWDER, FOR SOLUTION INTRAMUSCULAR; INTRAVENOUS at 02:13

## 2021-02-01 RX ADMIN — METHYLPREDNISOLONE SODIUM SUCCINATE SCH MG: 125 INJECTION, POWDER, FOR SOLUTION INTRAMUSCULAR; INTRAVENOUS at 10:56

## 2021-02-01 RX ADMIN — ALBUTEROL SULFATE PRN PUFF: 90 AEROSOL, METERED RESPIRATORY (INHALATION) at 14:54

## 2021-02-01 RX ADMIN — INSULIN ASPART SCH UNIT: 100 INJECTION, SOLUTION INTRAVENOUS; SUBCUTANEOUS at 20:58

## 2021-02-01 RX ADMIN — ALBUTEROL SULFATE PRN PUFF: 90 AEROSOL, METERED RESPIRATORY (INHALATION) at 08:31

## 2021-02-01 RX ADMIN — INSULIN ASPART SCH UNIT: 100 INJECTION, SOLUTION INTRAVENOUS; SUBCUTANEOUS at 23:08

## 2021-02-01 RX ADMIN — METHYLPREDNISOLONE SODIUM SUCCINATE SCH MG: 125 INJECTION, POWDER, FOR SOLUTION INTRAMUSCULAR; INTRAVENOUS at 06:53

## 2021-02-01 RX ADMIN — MONTELUKAST SODIUM SCH MG: 10 TABLET, FILM COATED ORAL at 20:34

## 2021-02-01 RX ADMIN — OXYCODONE HYDROCHLORIDE AND ACETAMINOPHEN SCH MG: 500 TABLET ORAL at 08:17

## 2021-02-01 RX ADMIN — METHYLPREDNISOLONE SODIUM SUCCINATE SCH MG: 125 INJECTION, POWDER, FOR SOLUTION INTRAMUSCULAR; INTRAVENOUS at 18:05

## 2021-02-01 RX ADMIN — COLCHICINE SCH MG: 0.6 TABLET, FILM COATED ORAL at 20:32

## 2021-02-01 RX ADMIN — Medication SCH MG: at 20:32

## 2021-02-01 RX ADMIN — ALBUTEROL SULFATE PRN PUFF: 90 AEROSOL, METERED RESPIRATORY (INHALATION) at 12:01

## 2021-02-01 RX ADMIN — EZETIMIBE SCH MG: 10 TABLET ORAL at 20:32

## 2021-02-01 NOTE — XR
EXAMINATION TYPE: XR chest 1V portable

 

DATE OF EXAM: 2/1/2021

 

COMPARISON: 1/13/2021

 

HISTORY: Shortness of breath

 

TECHNIQUE:  Frontal and lateral views of the chest are obtained.

 

FINDINGS:

 

Scattered senescent parenchymal changes noted. Hyperinflation compatible with COPD. 

 

Patchy perihilar and basilar infiltrates persist although there is interval improvement noted. Heart 
size is stable.

 

Mediastinal structures are stable and grossly unremarkable.

 

No evidence for hilar prominence.

 

Degenerative changes dorsal spine. 

 

IMPRESSION:

1. Patchy perihilar and basilar infiltrates persist although there is interval improvement noted.

## 2021-02-01 NOTE — P.PN
Subjective





86-year-old the vanessa gentleman was transferred from [facility] for Covid 19 and treatment with Actemra, which is unfortunately not 

available at this time and this hospital.  Patient doesn't have any more fevers 

patient is on 15 L of high flow nasal cannula oxygen patient has bilateral 

rhonchi.  Patient is not a candidate for Remdesivir but patient is on Decadron, 

colchicine, zinc and ascorbic acid.  Patient denied any fever chills nausea 

vomiting abdominal pain dysuria.








01/31/2021


Patient remains on high flow nasal cannula oxygen.





02/01/2021 


Patient remains on 15 L of oxygen clinically doing well.





Constitutional: Denied any fatigue denied any fever.


Cardio vascular: denied any chest pain, palpitations


Gastrointestinal denied any nausea vomiting


Pulmonary: Denied any shortness of breath cough


Neurologic denied any new focal deficits





All inpatient medications were reviewed and appropriate changes in these medica

tions as dictated in the interval history and assessment and plan.





Objective





- Vital Signs


Vital signs: 


                                   Vital Signs











Temp  98.6 F   01/31/21 20:00


 


Pulse  66   02/01/21 09:00


 


Resp  22   02/01/21 09:00


 


BP  136/70   02/01/21 09:00


 


Pulse Ox  92 L  02/01/21 09:00








                                 Intake & Output











 01/31/21 02/01/21 02/01/21





 18:59 06:59 18:59


 


Output Total 730 610 225


 


Balance -730 -610 -225


 


Output:   


 


  Urine 730 610 225


 


Other:   


 


  Voiding Method Indwelling Catheter Indwelling Catheter 














- Exam





PHYSICAL EXAMINATION: 





GENERAL: The patient is alert and oriented x3, not in any acute distress. Well 

developed, well nourished. 


HEENT: Pupils are round and equally reacting to light. EOMI. No scleral icterus.

No conjunctival pallor. Normocephalic, atraumatic. No pharyngeal erythema. No 

thyromegaly. 


CARDIOVASCULAR: S1 and S2 present. No murmurs, rubs, or gallops. 


PULMONARY: Diffuse bilateral rhonchi no wheezing was appreciated.


ABDOMEN: Soft, nontender, nondistended, normoactive bowel sounds. No palpable 

organomegaly. 


MUSCULOSKELETAL: No joint swelling or deformity.


EXTREMITIES: No cyanosis, clubbing, or pedal edema. 


NEUROLOGICAL: Gross neurological examination did not reveal any focal deficits. 


SKIN: No rashes. 





Note: Because of COVID 19 isolation, some of the history and physical exam 

findings are indirect and obtained from nursing staff, and other physician 

examinations to avoid unnecessary contact with the patient.

















- Labs


CBC & Chem 7: 


                                 01/31/21 03:29





                                 02/01/21 02:59


Labs: 


                  Abnormal Lab Results - Last 24 Hours (Table)











  02/01/21 02/01/21 Range/Units





  02:59 02:59 


 


D-Dimer   0.81 H  (<0.60)  mg/L FEU


 


Chloride  108 H   ()  mmol/L


 


BUN  50 H   (9-20)  mg/dL


 


Glucose  142 H   (74-99)  mg/dL


 


Lactate Dehydrogenase  919 H   (313-618)  U/L














Assessment and Plan


Plan: 


-Acute respiratory failure secondary to covid 19 pneumonia and patient continue 

with the above-mentioned medications that his Decadron, colchicine, zinc oxide. 

Supportive care continue with oxygen as needed.  


-Coronary artery disease status post coronary artery bypass grafting


-CVA/TIA in the past 


- gastroesophageal reflux disease and stent-hyperlipidemia


-Hypertension


-Hyperlipidemia


Depression.


-Seizure disorder for which patient and Keppra which will be continued


-DVT prophylaxis with Lovenox

## 2021-02-01 NOTE — PN
PROGRESS NOTE



DATE OF SERVICE:

02/01/2021



REASON FOR FOLLOWUP:

COVID-19 infection.



INTERVAL HISTORY:

The patient is currently afebrile.  The patient is still requiring high-flow oxygen on

15 L.  He did have a congested cough.  No vomiting, diarrhea or any other changes

reported by the nursing staff.



PHYSICAL EXAMINATION:

Blood pressure 131/74 with a pulse of 57, temperature 97.  He is 94% on 15 L high-flow

oxygen.

General description is an elderly male lying in bed in no distress.

RESPIRATORY SYSTEM: Unlabored breathing, decreased intensity of breath sounds. No

wheeze.

HEART: S1, S2.  Regular rate and rhythm.

ABDOMEN:  Soft, no tenderness.



LABS:

Hemoglobin 13, white count 11.2.  D-Dimer is 0.81. Creatinine 1.07.



DIAGNOSTIC IMPRESSION AND PLAN:

Patient with acute COVID-19 infection in this patient did have significant worsening

and was transferred for the sole reason of getting Actemra which is not available at

this facility.  The patient may benefit from transfer to [facility].  Currently covered

with Solu-Medrol, zinc, folic acid, and monitor his clinical course closely.





MMODL / IJN: [id number] / Job#: [id number]

## 2021-02-01 NOTE — P.PN
Subjective


Progress Note Date: 02/01/21


Principal diagnosis: 





COVID 19 pneumonia





Progress note dated 02/01/2021.





This is an 86-year-old male that I actually saw in consultation on January 28, 

at [facility].  The patient presented to [facility] 

initially on January 19 and was tested positive for coronavirus infection.  At 

that time, the patient was relatively stable, and his chest x-ray was normal so 

he was discharged home.  He returned to the emergency room on January 22, and 

then again on the 27th.  Again, on the 22nd, his chest x-ray was relatively 

normal and he was not really having any respiratory issues.  The big change 

occurred on the 27th, with the patient's chest x-ray was clearly abnormal and 

the patient was quite hypoxemic.  I felt the patient was a good candidate for 

tocilizumab, and the patient was transferred to [facility] on 

January 29.  He has not yet received that drug as there is none available.  

Currently, the patient's on 15 L nasal cannula, and not on any IV fluids.  He 

was started on the antiviral drug, remdesivir, by infectious diseases, but he 

was clearly outside the window for that medication.  The patient has a history 

of TIA, coronary artery disease, status post bypass grafting, degenerative joint

disease, hyperlipidemia, mild asthma, and gastroesophageal reflux disease.  The 

patient also previously underwent right lower lobectomy for suspected lung 

cancer.





Objective





- Vital Signs


Vital signs: 


                                   Vital Signs











Temp  98.6 F   01/31/21 20:00


 


Pulse  66   02/01/21 09:00


 


Resp  22   02/01/21 09:00


 


BP  136/70   02/01/21 09:00


 


Pulse Ox  92 L  02/01/21 09:00








                                 Intake & Output











 01/31/21 02/01/21 02/01/21





 18:59 06:59 18:59


 


Output Total 730 610 225


 


Balance -730 -610 -225


 


Output:   


 


  Urine 730 610 225


 


Other:   


 


  Voiding Method Indwelling Catheter Indwelling Catheter 














- Exam





No acute distress, oriented, frequent wet cough, no obvious distress, no audible

wheezing, or use of accessory muscles.  Nasal cannula and place.





HEENT examination is grossly unremarkable.  Mucous membranes are moist. 





Neck supple.  Full range of motion.  No adenopathy thyromegaly or neck vein 

distention.





Cardiovascular examination reveals regular rhythm rate.  S1-S2 normal.  No S3 or

S4.  No discernible murmur noted.  Heart sounds are distant and heart rate is 66

bpm.





Lungs reveal coarse bilateral rhonchi and a few scattered bilateral crackles.  

There are no wheezes.  Breath sounds equal bilaterally.  





Abdomen soft bowel sounds are heard.  No masses or tenderness.





Extremities are intact.  No cyanosis, clubbing or edema.





Skin is without rash or lesion.  Multiple areas of ecchymoses are noted.





Neurologic examination is brief but nonfocal.





- Labs


CBC & Chem 7: 


                                 01/31/21 03:29





                                 02/01/21 02:59


Labs: 


                  Abnormal Lab Results - Last 24 Hours (Table)











  02/01/21 02/01/21 Range/Units





  02:59 02:59 


 


D-Dimer   0.81 H  (<0.60)  mg/L FEU


 


Chloride  108 H   ()  mmol/L


 


BUN  50 H   (9-20)  mg/dL


 


Glucose  142 H   (74-99)  mg/dL


 


Lactate Dehydrogenase  919 H   (313-618)  U/L














Assessment and Plan


Assessment: 





COVID 19 pneumonia/pneumonitis, with acute hypoxemic respiratory failure.





History of TIA.





History of CAD, with prior bypass grafting.





History of degenerative joint disease.





Hyperlipidemia.





Gastroesophageal reflux disease, without esophagitis.





History of mild intermittent asthma.





Prior history of right lower lobectomy.


Plan: 





Plan dated 02/01/2021.





The patient will continue on his current medications which include the vitamin 

cocktail, as well as GI and DVT prophylaxis.  I started the patient on 

colchicine when I saw him on January 28 at the other hospital.  We will continue

with oxygen therapy to maintain saturations above 90%.  We'll hoping that the 

patient gets tocilizumab if it becomes available.  The patient was beyond the 

window for the antiviral drug remdesivir.  Currently, the patient's prognosis is

guarded given his age and multiple medical problems.  Additional recommendations

and suggestions are forthcoming.  We will continue to follow closely.  Again, as

new information becomes available, anything we can do to help this patient 

survive this illness, will be done.


Time with Patient: Greater than 30

## 2021-02-02 LAB
ANION GAP SERPL CALC-SCNC: 10 MMOL/L
BASOPHILS # BLD AUTO: 0 K/UL (ref 0–0.2)
BASOPHILS NFR BLD AUTO: 0 %
BUN SERPL-SCNC: 55 MG/DL (ref 9–20)
CALCIUM SPEC-MCNC: 9.2 MG/DL (ref 8.4–10.2)
CHLORIDE SERPL-SCNC: 109 MMOL/L (ref 98–107)
CO2 SERPL-SCNC: 22 MMOL/L (ref 22–30)
EOSINOPHIL # BLD AUTO: 0.1 K/UL (ref 0–0.7)
EOSINOPHIL NFR BLD AUTO: 1 %
ERYTHROCYTE [DISTWIDTH] IN BLOOD BY AUTOMATED COUNT: 4.86 M/UL (ref 4.3–5.9)
ERYTHROCYTE [DISTWIDTH] IN BLOOD: 13.3 % (ref 11.5–15.5)
GLUCOSE BLD-MCNC: 161 MG/DL (ref 75–99)
GLUCOSE BLD-MCNC: 169 MG/DL (ref 75–99)
GLUCOSE BLD-MCNC: 187 MG/DL (ref 75–99)
GLUCOSE SERPL-MCNC: 134 MG/DL (ref 74–99)
HCT VFR BLD AUTO: 42.8 % (ref 39–53)
HGB BLD-MCNC: 14.2 GM/DL (ref 13–17.5)
LDH SPEC-CCNC: 812 U/L (ref 313–618)
LYMPHOCYTES # SPEC AUTO: 0.9 K/UL (ref 1–4.8)
LYMPHOCYTES NFR SPEC AUTO: 9 %
MCH RBC QN AUTO: 29.2 PG (ref 25–35)
MCHC RBC AUTO-ENTMCNC: 33.3 G/DL (ref 31–37)
MCV RBC AUTO: 88 FL (ref 80–100)
MONOCYTES # BLD AUTO: 0.6 K/UL (ref 0–1)
MONOCYTES NFR BLD AUTO: 5 %
NEUTROPHILS # BLD AUTO: 8.7 K/UL (ref 1.3–7.7)
NEUTROPHILS NFR BLD AUTO: 84 %
PLATELET # BLD AUTO: 295 K/UL (ref 150–450)
POTASSIUM SERPL-SCNC: 4.8 MMOL/L (ref 3.5–5.1)
SODIUM SERPL-SCNC: 141 MMOL/L (ref 137–145)
WBC # BLD AUTO: 10.4 K/UL (ref 3.8–10.6)

## 2021-02-02 PROCEDURE — 5A0945A ASSISTANCE WITH RESPIRATORY VENTILATION, 24-96 CONSECUTIVE HOURS, HIGH NASAL FLOW/VELOCITY: ICD-10-PCS

## 2021-02-02 RX ADMIN — Medication SCH MG: at 21:38

## 2021-02-02 RX ADMIN — ALBUTEROL SULFATE PRN PUFF: 90 AEROSOL, METERED RESPIRATORY (INHALATION) at 12:05

## 2021-02-02 RX ADMIN — FAMOTIDINE SCH MG: 20 TABLET, FILM COATED ORAL at 08:45

## 2021-02-02 RX ADMIN — METHYLPREDNISOLONE SODIUM SUCCINATE SCH MG: 125 INJECTION, POWDER, FOR SOLUTION INTRAMUSCULAR; INTRAVENOUS at 17:49

## 2021-02-02 RX ADMIN — TAMSULOSIN HYDROCHLORIDE SCH MG: 0.4 CAPSULE ORAL at 08:45

## 2021-02-02 RX ADMIN — Medication SCH MG: at 08:45

## 2021-02-02 RX ADMIN — COLCHICINE SCH MG: 0.6 TABLET, FILM COATED ORAL at 08:46

## 2021-02-02 RX ADMIN — CLOPIDOGREL BISULFATE SCH MG: 75 TABLET ORAL at 08:45

## 2021-02-02 RX ADMIN — ALBUTEROL SULFATE PRN PUFF: 90 AEROSOL, METERED RESPIRATORY (INHALATION) at 08:06

## 2021-02-02 RX ADMIN — TAMSULOSIN HYDROCHLORIDE SCH MG: 0.4 CAPSULE ORAL at 21:38

## 2021-02-02 RX ADMIN — ALBUTEROL SULFATE PRN PUFF: 90 AEROSOL, METERED RESPIRATORY (INHALATION) at 20:46

## 2021-02-02 RX ADMIN — EZETIMIBE SCH MG: 10 TABLET ORAL at 21:37

## 2021-02-02 RX ADMIN — ASPIRIN 81 MG CHEWABLE TABLET SCH MG: 81 TABLET CHEWABLE at 08:45

## 2021-02-02 RX ADMIN — COLCHICINE SCH: 0.6 TABLET, FILM COATED ORAL at 17:27

## 2021-02-02 RX ADMIN — ATORVASTATIN CALCIUM SCH MG: 10 TABLET, FILM COATED ORAL at 21:38

## 2021-02-02 RX ADMIN — OXYCODONE HYDROCHLORIDE AND ACETAMINOPHEN SCH MG: 500 TABLET ORAL at 08:44

## 2021-02-02 RX ADMIN — Medication SCH MG: at 05:56

## 2021-02-02 RX ADMIN — ENOXAPARIN SODIUM SCH MG: 40 INJECTION SUBCUTANEOUS at 08:44

## 2021-02-02 RX ADMIN — ISOSORBIDE MONONITRATE SCH MG: 30 TABLET, EXTENDED RELEASE ORAL at 08:45

## 2021-02-02 RX ADMIN — MONTELUKAST SODIUM SCH MG: 10 TABLET, FILM COATED ORAL at 21:37

## 2021-02-02 RX ADMIN — METHYLPREDNISOLONE SODIUM SUCCINATE SCH MG: 125 INJECTION, POWDER, FOR SOLUTION INTRAMUSCULAR; INTRAVENOUS at 05:56

## 2021-02-02 RX ADMIN — INSULIN ASPART SCH UNIT: 100 INJECTION, SOLUTION INTRAVENOUS; SUBCUTANEOUS at 06:09

## 2021-02-02 RX ADMIN — INSULIN ASPART SCH UNIT: 100 INJECTION, SOLUTION INTRAVENOUS; SUBCUTANEOUS at 17:47

## 2021-02-02 RX ADMIN — Medication SCH MCG: at 08:45

## 2021-02-02 RX ADMIN — VENLAFAXINE HYDROCHLORIDE SCH MG: 150 CAPSULE, EXTENDED RELEASE ORAL at 08:46

## 2021-02-02 RX ADMIN — METHYLPREDNISOLONE SODIUM SUCCINATE SCH MG: 125 INJECTION, POWDER, FOR SOLUTION INTRAMUSCULAR; INTRAVENOUS at 11:55

## 2021-02-02 RX ADMIN — INSULIN ASPART SCH UNIT: 100 INJECTION, SOLUTION INTRAVENOUS; SUBCUTANEOUS at 12:43

## 2021-02-02 NOTE — P.PN
Subjective


Progress Note Date: 02/02/21


Principal diagnosis: 





COVID 19 pneumonia





Progress note dated 02/01/2021.





This is an 86-year-old male that I actually saw in consultation on January 28, 

at [facility].  The patient presented to [facility] 

initially on January 19 and was tested positive for coronavirus infection.  At 

that time, the patient was relatively stable, and his chest x-ray was normal so 

he was discharged home.  He returned to the emergency room on January 22, and 

then again on the 27th.  Again, on the 22nd, his chest x-ray was relatively 

normal and he was not really having any respiratory issues.  The big change 

occurred on the 27th, with the patient's chest x-ray was clearly abnormal and 

the patient was quite hypoxemic.  I felt the patient was a good candidate for 

tocilizumab, and the patient was transferred to [facility] on 

January 29.  He has not yet received that drug as there is none available.  

Currently, the patient's on 15 L nasal cannula, and not on any IV fluids.  He 

was started on the antiviral drug, remdesivir, by infectious diseases, but he 

was clearly outside the window for that medication.  The patient has a history 

of TIA, coronary artery disease, status post bypass grafting, degenerative joint

disease, hyperlipidemia, mild asthma, and gastroesophageal reflux disease.  The 

patient also previously underwent right lower lobectomy for suspected lung 

cancer.





Progress note dated 2/2/2021.





86-year-old male that I initially saw in consultation at [facility].  Please see the note above.  The patient was at that hospital on 

January 19, and again on the 22nd.  He went there third time on the 27th, and 

was admitted.  He tested positive for COVID 19 on the 19th.  Initially, he was 

not having any respiratory issues and his chest x-ray was normal.  On his final 

trip to the hospital, he was short of breath, hypoxemic, and his chest x-ray had

dramatically changed.  Hence he was admitted.  The patient was transferred here 

for tocilizumab, which unfortunately, is not available to the patient's here.  

The patient is doing better.  Yesterday, he was on 15 L nasal cannula.  He is 

not receiving any IV fluids.  Today he is down to 8 L.  His white count is 10.4,

hemoglobin 14.2, platelet count 295,000.  The d-dimer level is 0.65.  Sodium is 

141, potassium 4.8, chlorides 109, CO2 22, anion gap 10, BUN 55, and creatinine 

0.92.





Objective





- Vital Signs


Vital signs: 


                                   Vital Signs











Temp  97.6 F   02/02/21 04:00


 


Pulse  56 L  02/02/21 07:00


 


Resp  25 H  02/02/21 07:00


 


BP  162/70   02/02/21 07:00


 


Pulse Ox  94 L  02/02/21 07:00








                                 Intake & Output











 02/01/21 02/02/21 02/02/21





 18:59 06:59 18:59


 


Intake Total 536 240 


 


Output Total 680 435 20


 


Balance -144 -195 -20


 


Weight  68.5 kg 


 


Intake:   


 


  Oral 536 240 


 


Output:   


 


  Urine 680 435 20


 


Other:   


 


  Voiding Method Indwelling Catheter Indwelling Catheter 














- Exam





No acute distress, oriented, frequent wet cough, no obvious distress, no audible

wheezing, or use of accessory muscles.  Nasal cannula in place.





HEENT examination is grossly unremarkable.  Mucous membranes are moist. 





Neck supple.  Full range of motion.  No adenopathy thyromegaly or neck vein 

distention.





Cardiovascular examination reveals regular rhythm rate.  S1-S2 normal.  No S3 or

S4.  No discernible murmur noted.  Heart sounds are distant and heart rate is 56

bpm.





Lungs reveal coarse bilateral rhonchi and a few scattered bilateral crackles.  

There are no wheezes.  Breath sounds equal bilaterally.  Breath sounds are 

unchanged.





Abdomen soft bowel sounds are heard.  No masses or tenderness.





Extremities are intact.  No cyanosis, clubbing or edema.





Skin is without rash or lesion.  Multiple areas of ecchymoses are noted.





Neurologic examination is brief but nonfocal.





- Labs


CBC & Chem 7: 


                                 02/02/21 03:25





                                 02/02/21 03:25


Labs: 


                  Abnormal Lab Results - Last 24 Hours (Table)











  02/01/21 02/01/21 02/02/21 Range/Units





  20:46 23:04 03:25 


 


Neutrophils #     (1.3-7.7)  k/uL


 


Lymphocytes #     (1.0-4.8)  k/uL


 


D-Dimer     (<0.60)  mg/L FEU


 


Chloride    109 H  ()  mmol/L


 


BUN    55 H  (9-20)  mg/dL


 


Glucose    134 H  (74-99)  mg/dL


 


POC Glucose (mg/dL)  202 H  193 H   (75-99)  mg/dL


 


Lactate Dehydrogenase    812 H  (313-618)  U/L














  02/02/21 02/02/21 02/02/21 Range/Units





  03:25 03:25 06:07 


 


Neutrophils #   8.7 H   (1.3-7.7)  k/uL


 


Lymphocytes #   0.9 L   (1.0-4.8)  k/uL


 


D-Dimer  0.65 H    (<0.60)  mg/L FEU


 


Chloride     ()  mmol/L


 


BUN     (9-20)  mg/dL


 


Glucose     (74-99)  mg/dL


 


POC Glucose (mg/dL)    187 H  (75-99)  mg/dL


 


Lactate Dehydrogenase     (313-618)  U/L














Assessment and Plan


Assessment: 





COVID 19 pneumonia/pneumonitis, with acute hypoxemic respiratory failure.





History of TIA.





History of CAD, with prior bypass grafting.





History of degenerative joint disease.





Hyperlipidemia.





Gastroesophageal reflux disease, without esophagitis.





History of mild intermittent asthma.





Prior history of right lower lobectomy.


Plan: 





Plan dated 02/01/2021.





The patient will continue on his current medications which include the vitamin 

cocktail, as well as GI and DVT prophylaxis.  I started the patient on 

colchicine when I saw him on January 28 at the other hospital.  We will continue

with oxygen therapy to maintain saturations above 90%.  We'll hoping that the 

patient gets tocilizumab if it becomes available.  The patient was beyond the 

window for the antiviral drug remdesivir.  Currently, the patient's prognosis is

guarded given his age and multiple medical problems.  Additional recommendations

and suggestions are forthcoming.  We will continue to follow closely.  Again, as

new information becomes available, anything we can do to help this patient 

survive this illness, will be done.





Plan dated 02/02/2021 





Currently, the patient appears to be doing a bit better.  He is down from 15 L 

high flow, to 8 L high flow nasal cannula.  The patient is not receiving any IV 

fluids.  He is currently not a candidate for tocilizumab.   The patient is 

stable for transfer to the general medical floor without telemetry.  We will 

continue with the cocktail of vitamins, as well as corticosteroids.  Additional 

recommendations and suggestions are forthcoming.  Prognosis is guarded.  Given 

his age, there is high risk for possible deterioration.  He would not be a good 

candidate for intubation and mechanical ventilation and that should be addressed

by the primary service.


Time with Patient: Less than 30

## 2021-02-02 NOTE — P.PN
Subjective





86-year-old the vanessa gentleman was transferred from [facility] for Covid 19 and treatment with Actemra, which is unfortunately not 

available at this time and this hospital.  Patient doesn't have any more fevers 

patient is on 15 L of high flow nasal cannula oxygen patient has bilateral 

rhonchi.  Patient is not a candidate for Remdesivir but patient is on Decadron, 

colchicine, zinc and ascorbic acid.  Patient denied any fever chills nausea 

vomiting abdominal pain dysuria.








01/31/2021


Patient remains on high flow nasal cannula oxygen.





02/01/2021 


Patient remains on 15 L of oxygen clinically doing well.





02/02/2021


Patient the is doing better today.  Patient is presently on 9 L of oxygen.





Constitutional: Denied any fatigue denied any fever.


Cardio vascular: denied any chest pain, palpitations


Gastrointestinal denied any nausea vomiting


Pulmonary: Denied any shortness of breath cough


Neurologic denied any new focal deficits





All inpatient medications were reviewed and appropriate changes in these 

medications as dictated in the interval history and assessment and plan.





Objective





- Vital Signs


Vital signs: 


                                   Vital Signs











Temp  97.2 F L  02/02/21 08:00


 


Pulse  63   02/02/21 10:00


 


Resp  30 H  02/02/21 10:00


 


BP  132/66   02/02/21 10:00


 


Pulse Ox  92 L  02/02/21 10:00








                                 Intake & Output











 02/01/21 02/02/21 02/02/21





 18:59 06:59 18:59


 


Intake Total 536 240 200


 


Output Total 680 435 135


 


Balance -144 -195 65


 


Weight  68.5 kg 


 


Intake:   


 


  Oral 536 240 200


 


Output:   


 


  Urine 680 435 135


 


Other:   


 


  Voiding Method Indwelling Catheter Indwelling Catheter Indwelling Catheter














- Exam





PHYSICAL EXAMINATION: 





GENERAL: The patient is alert and oriented x3, not in any acute distress. Well 

developed, well nourished. 


HEENT: Pupils are round and equally reacting to light. EOMI. No scleral icterus.

No conjunctival pallor. Normocephalic, atraumatic. No pharyngeal erythema. No 

thyromegaly. 


CARDIOVASCULAR: S1 and S2 present. No murmurs, rubs, or gallops. 


PULMONARY: Diffuse bilateral rhonchi no wheezing was appreciated.


ABDOMEN: Soft, nontender, nondistended, normoactive bowel sounds. No palpable 

organomegaly. 


MUSCULOSKELETAL: No joint swelling or deformity.


EXTREMITIES: No cyanosis, clubbing, or pedal edema. 


NEUROLOGICAL: Gross neurological examination did not reveal any focal deficits. 


SKIN: No rashes. 





Note: Because of COVID 19 isolation, some of the history and physical exam 

findings are indirect and obtained from nursing staff, and other physician 

examinations to avoid unnecessary contact with the patient.

















- Labs


CBC & Chem 7: 


                                 02/02/21 03:25





                                 02/02/21 03:25


Labs: 


                  Abnormal Lab Results - Last 24 Hours (Table)











  02/01/21 02/01/21 02/02/21 Range/Units





  20:46 23:04 03:25 


 


Neutrophils #     (1.3-7.7)  k/uL


 


Lymphocytes #     (1.0-4.8)  k/uL


 


D-Dimer     (<0.60)  mg/L FEU


 


Chloride    109 H  ()  mmol/L


 


BUN    55 H  (9-20)  mg/dL


 


Glucose    134 H  (74-99)  mg/dL


 


POC Glucose (mg/dL)  202 H  193 H   (75-99)  mg/dL


 


Lactate Dehydrogenase    812 H  (313-618)  U/L














  02/02/21 02/02/21 02/02/21 Range/Units





  03:25 03:25 06:07 


 


Neutrophils #   8.7 H   (1.3-7.7)  k/uL


 


Lymphocytes #   0.9 L   (1.0-4.8)  k/uL


 


D-Dimer  0.65 H    (<0.60)  mg/L FEU


 


Chloride     ()  mmol/L


 


BUN     (9-20)  mg/dL


 


Glucose     (74-99)  mg/dL


 


POC Glucose (mg/dL)    187 H  (75-99)  mg/dL


 


Lactate Dehydrogenase     (313-618)  U/L














Assessment and Plan


Plan: 


-Acute respiratory failure secondary to covid 19 pneumonia and patient continue 

with the above-mentioned medications that his Decadron, colchicine, zinc oxide. 

Supportive care continue with oxygen as needed.  Is presently on 9 L of oxygen 

being transferred out of ICU.


-Coronary artery disease status post coronary artery bypass grafting


-CVA/TIA in the past 


- gastroesophageal reflux disease and stent-hyperlipidemia


-Hypertension


-Hyperlipidemia


Depression.


-Seizure disorder for which patient and Keppra which will be continued


-DVT prophylaxis with Lovenox

## 2021-02-02 NOTE — PN
PROGRESS NOTE



DATE OF SERVICE:

02/02/2021



REASON FOR FOLLOWUP:

COVID-19 pneumonia.



INTERVAL HISTORY:

The patient is currently afebrile, has been transferred out of the ICU.  The patient is

currently on 8 L high-flow oxygen.  The patient denies having any chest pain.  He is

very hard of hearing. Cough but no sputum.  No abdominal pain.  No diarrhea has been

reported.



PHYSICAL EXAMINATION:

Blood pressure 172/74 with a pulse of 62, temperature 97.6.

General description is an elderly male up in the chair in no distress.

RESPIRATORY SYSTEM: Unlabored breathing. Coarse breath sounds bilaterally.  No wheeze.

HEART: S1, S2.  Regular rate and rhythm.

ABDOMEN: Soft. No tenderness.



LABS:

Hemoglobin 14.2, white count 10.4, BUN of 55, creatinine 0.92.  CRP 6.9.



DIAGNOSTIC IMPRESSION AND PLAN:

Patient with acute COVID-19 pneumonia in this patient whose x-ray did not show

significant change, though he did have less supplemental oxygen requirement.  Currently

covered with Solu-Medrol, colchicine, vitamin C, zinc; to continue along with

respiratory support.  Monitor his clinical course closely.





MMODL / IJN: [id number] / Job#: [id number]

## 2021-02-02 NOTE — XR
EXAMINATION TYPE: XR chest 1V portable

 

DATE OF EXAM: 2/2/2021

 

HISTORY: Shortness of breath.

 

COMPARISON: 2/1/2021

 

TECHNIQUE: Single view of the chest is submitted.

 

FINDINGS:

Demonstrated are scattered senescent parenchymal change.  

 

Patchy infiltrates throughout both lung fields noted. No significant change seen.

 

The heart is stable.

 

Hilar and mediastinal structures are within normal limits.  

 

Degenerative changes are seen of the dorsal spine. 

 

 IMPRESSION: 

 

1.  Patchy infiltrates throughout both lung fields noted. No significant change seen.

## 2021-02-03 LAB
GLUCOSE BLD-MCNC: 159 MG/DL (ref 75–99)
GLUCOSE BLD-MCNC: 180 MG/DL (ref 75–99)
GLUCOSE BLD-MCNC: 182 MG/DL (ref 75–99)
GLUCOSE BLD-MCNC: 202 MG/DL (ref 75–99)
GLUCOSE BLD-MCNC: 239 MG/DL (ref 75–99)

## 2021-02-03 RX ADMIN — FAMOTIDINE SCH MG: 20 TABLET, FILM COATED ORAL at 08:54

## 2021-02-03 RX ADMIN — Medication SCH MG: at 08:54

## 2021-02-03 RX ADMIN — Medication SCH MCG: at 08:54

## 2021-02-03 RX ADMIN — ENOXAPARIN SODIUM SCH MG: 40 INJECTION SUBCUTANEOUS at 09:05

## 2021-02-03 RX ADMIN — VENLAFAXINE HYDROCHLORIDE SCH MG: 150 CAPSULE, EXTENDED RELEASE ORAL at 09:05

## 2021-02-03 RX ADMIN — ATORVASTATIN CALCIUM SCH MG: 10 TABLET, FILM COATED ORAL at 22:13

## 2021-02-03 RX ADMIN — ALBUTEROL SULFATE PRN PUFF: 90 AEROSOL, METERED RESPIRATORY (INHALATION) at 12:55

## 2021-02-03 RX ADMIN — ALBUTEROL SULFATE PRN PUFF: 90 AEROSOL, METERED RESPIRATORY (INHALATION) at 20:34

## 2021-02-03 RX ADMIN — MONTELUKAST SODIUM SCH MG: 10 TABLET, FILM COATED ORAL at 22:13

## 2021-02-03 RX ADMIN — ISOSORBIDE MONONITRATE SCH MG: 30 TABLET, EXTENDED RELEASE ORAL at 08:54

## 2021-02-03 RX ADMIN — INSULIN ASPART SCH UNIT: 100 INJECTION, SOLUTION INTRAVENOUS; SUBCUTANEOUS at 16:56

## 2021-02-03 RX ADMIN — METHYLPREDNISOLONE SODIUM SUCCINATE SCH MG: 125 INJECTION, POWDER, FOR SOLUTION INTRAMUSCULAR; INTRAVENOUS at 16:55

## 2021-02-03 RX ADMIN — INSULIN ASPART SCH UNIT: 100 INJECTION, SOLUTION INTRAVENOUS; SUBCUTANEOUS at 12:54

## 2021-02-03 RX ADMIN — METHYLPREDNISOLONE SODIUM SUCCINATE SCH MG: 125 INJECTION, POWDER, FOR SOLUTION INTRAMUSCULAR; INTRAVENOUS at 12:53

## 2021-02-03 RX ADMIN — OXYCODONE HYDROCHLORIDE AND ACETAMINOPHEN SCH MG: 500 TABLET ORAL at 08:54

## 2021-02-03 RX ADMIN — TAMSULOSIN HYDROCHLORIDE SCH MG: 0.4 CAPSULE ORAL at 22:13

## 2021-02-03 RX ADMIN — ASPIRIN 81 MG CHEWABLE TABLET SCH MG: 81 TABLET CHEWABLE at 08:54

## 2021-02-03 RX ADMIN — METHYLPREDNISOLONE SODIUM SUCCINATE SCH MG: 125 INJECTION, POWDER, FOR SOLUTION INTRAMUSCULAR; INTRAVENOUS at 06:08

## 2021-02-03 RX ADMIN — COLCHICINE SCH MG: 0.6 TABLET, FILM COATED ORAL at 22:13

## 2021-02-03 RX ADMIN — CLOPIDOGREL BISULFATE SCH MG: 75 TABLET ORAL at 08:54

## 2021-02-03 RX ADMIN — COLCHICINE SCH MG: 0.6 TABLET, FILM COATED ORAL at 08:55

## 2021-02-03 RX ADMIN — EZETIMIBE SCH MG: 10 TABLET ORAL at 22:13

## 2021-02-03 RX ADMIN — Medication SCH MG: at 22:13

## 2021-02-03 RX ADMIN — TAMSULOSIN HYDROCHLORIDE SCH MG: 0.4 CAPSULE ORAL at 08:54

## 2021-02-03 RX ADMIN — METHYLPREDNISOLONE SODIUM SUCCINATE SCH MG: 125 INJECTION, POWDER, FOR SOLUTION INTRAMUSCULAR; INTRAVENOUS at 00:09

## 2021-02-03 RX ADMIN — INSULIN ASPART SCH UNIT: 100 INJECTION, SOLUTION INTRAVENOUS; SUBCUTANEOUS at 06:08

## 2021-02-03 RX ADMIN — Medication SCH MG: at 09:05

## 2021-02-03 RX ADMIN — INSULIN ASPART SCH UNIT: 100 INJECTION, SOLUTION INTRAVENOUS; SUBCUTANEOUS at 00:09

## 2021-02-03 NOTE — P.PN
Subjective


Progress Note Date: 02/03/21





86-year-old the vanessa gentleman was transferred from [facility] for Covid 19 and treatment with Actemra, which is unfortunately not 

available at this time and this hospital.  Patient doesn't have any more fevers 

patient is on 15 L of high flow nasal cannula oxygen patient has bilateral r

honchi.  Patient is not a candidate for Remdesivir but patient is on Decadron, 

colchicine, zinc and ascorbic acid.  Patient denied any fever chills nausea 

vomiting abdominal pain dysuria.








01/31/2021


Patient remains on high flow nasal cannula oxygen.





02/01/2021 


Patient remains on 15 L of oxygen clinically doing well.





02/02/2021


Patient the is doing better today.  Patient is presently on 9 L of oxygen.





02/03/2021


Patient is seen and evaluated in follow-up continues to be on high flow oxygen 

and weaning FiO2 as tolerated.  Patient is presently on 8 L high flow.  Patient 

is maintained on Lovenox along with dexamethasone, culture seen, vitamin C and 

D, and zinc supplements.  Chest x-ray today shows continued patchy densities of 

the left lung base with some improvement in aeration.  Social work consulted the

possibility of ECF on discharge is patient continues to be weak.  PT/OT to evalu

ate the patient.





Constitutional: Denied any fatigue denied any fever.


Cardio vascular: denied any chest pain, palpitations


Gastrointestinal denied any nausea vomiting


Pulmonary: Denied any shortness of breath or cough


Neurologic denied any new focal deficits





All inpatient medications were reviewed and appropriate changes in these 

medications as dictated in the interval history and assessment and plan.








Objective





- Vital Signs


Vital signs: 


                                   Vital Signs











Temp  97.8 F   02/03/21 14:00


 


Pulse  58 L  02/03/21 14:00


 


Resp  18   02/03/21 14:00


 


BP  138/70   02/03/21 14:00


 


Pulse Ox  93 L  02/03/21 14:00








                                 Intake & Output











 02/02/21 02/03/21 02/03/21





 18:59 06:59 18:59


 


Intake Total 400  


 


Output Total 135  


 


Balance 265  


 


Weight  70 kg 


 


Intake:   


 


  Oral 400  


 


Output:   


 


  Urine 135  


 


Other:   


 


  Voiding Method Indwelling Catheter Bedside Commode 


 


  # Voids 2 4 


 


  # Bowel Movements 3 1 














- Exam





GENERAL: The patient is alert and oriented x3, not in any acute distress. Well 

developed, well nourished. 


HEENT: Pupils are round and equally reacting to light. EOMI. No scleral icterus.

No conjunctival pallor. Normocephalic, atraumatic. No pharyngeal erythema. No 

thyromegaly. 


CARDIOVASCULAR: S1 and S2 present. No murmurs, rubs, or gallops. 


PULMONARY: Diffuse bilateral rhonchi with no wheezing noted on exam


ABDOMEN: Soft, nontender, nondistended, normoactive bowel sounds. No palpable 

organomegaly. 


MUSCULOSKELETAL: No joint swelling or deformity.


EXTREMITIES: No cyanosis, clubbing, or pedal edema. 


NEUROLOGICAL: Gross neurological examination did not reveal any focal deficits. 


SKIN: No rashes. 





Note: Because of COVID 19 isolation, some of the history and physical exam 

findings are indirect and obtained from nursing staff, and other physician 

examinations to avoid unnecessary contact with the patient.








- Labs


CBC & Chem 7: 


                                 02/02/21 03:25





                                 02/02/21 03:25


Labs: 


                  Abnormal Lab Results - Last 24 Hours (Table)











  02/02/21 02/02/21 02/03/21 Range/Units





  17:02 23:59 06:02 


 


POC Glucose (mg/dL)  161 H  202 H  159 H  (75-99)  mg/dL














  02/03/21 Range/Units





  11:36 


 


POC Glucose (mg/dL)  180 H  (75-99)  mg/dL














Assessment and Plan


Assessment: 





-Acute respiratory failure secondary to covid 19 pneumonia and patient is 

maintained on Decadron along with colchicine, vitamin C and E supplements along 

with zinc supplements, and Lovenox and to continue.  Weaning FiO2 as tolerated 

and presently on 8 L high flow and oxygen saturation is 93%.  Pulmonary 

following 


-Coronary artery disease status post coronary artery bypass grafting, stents


-CVA/TIA in the past 


-gastroesophageal reflux disease


-hyperlipidemia


-Hypertension


-Depression


-Seizure disorder for which patient is on Keppra which will be continued


-DVT prophylaxis with Lovenox


-GI prophylaxis with Pepcid


-Full code

## 2021-02-03 NOTE — XR
EXAMINATION TYPE: XR chest 1V portable

 

DATE OF EXAM: 2/3/2021

 

COMPARISON: Chest x-ray 2/2/2021, 1/30/2021

 

HISTORY: Covid pneumonia

 

TECHNIQUE: Single frontal view of the chest is obtained.

 

FINDINGS:  There is elevation right hemidiaphragm. Patient is post median sternotomy. Patchy density 
present at the left lung base has improved since January chest x-ray. No evident pneumothorax. No jenna
dent effusion. There are overlying artifacts.

 

IMPRESSION:  Correlate for pneumonia. There is improvement in aeration.

## 2021-02-03 NOTE — P.PN
Subjective


Progress Note Date: 02/03/21


Principal diagnosis: 





COVID 19 pneumonia





Progress note dated 02/01/2021.





This is an 86-year-old male that I actually saw in consultation on January 28, 

at [facility].  The patient presented to [facility] 

initially on January 19 and was tested positive for coronavirus infection.  At 

that time, the patient was relatively stable, and his chest x-ray was normal so 

he was discharged home.  He returned to the emergency room on January 22, and 

then again on the 27th.  Again, on the 22nd, his chest x-ray was relatively 

normal and he was not really having any respiratory issues.  The big change 

occurred on the 27th, with the patient's chest x-ray was clearly abnormal and 

the patient was quite hypoxemic.  I felt the patient was a good candidate for 

tocilizumab, and the patient was transferred to [facility] on 

January 29.  He has not yet received that drug as there is none available.  

Currently, the patient's on 15 L nasal cannula, and not on any IV fluids.  He 

was started on the antiviral drug, remdesivir, by infectious diseases, but he 

was clearly outside the window for that medication.  The patient has a history 

of TIA, coronary artery disease, status post bypass grafting, degenerative joint

disease, hyperlipidemia, mild asthma, and gastroesophageal reflux disease.  The 

patient also previously underwent right lower lobectomy for suspected lung 

cancer.





Progress note dated 2/2/2021.





86-year-old male that I initially saw in consultation at [facility].  Please see the note above.  The patient was at that hospital on 

January 19, and again on the 22nd.  He went there third time on the 27th, and 

was admitted.  He tested positive for COVID 19 on the 19th.  Initially, he was 

not having any respiratory issues and his chest x-ray was normal.  On his final 

trip to the hospital, he was short of breath, hypoxemic, and his chest x-ray had

dramatically changed.  Hence he was admitted.  The patient was transferred here 

for tocilizumab, which unfortunately, is not available to the patient's here.  

The patient is doing better.  Yesterday, he was on 15 L nasal cannula.  He is 

not receiving any IV fluids.  Today he is down to 8 L.  His white count is 10.4,

hemoglobin 14.2, platelet count 295,000.  The d-dimer level is 0.65.  Sodium is 

141, potassium 4.8, chlorides 109, CO2 22, anion gap 10, BUN 55, and creatinine 

0.92.





Progress note dated 02/03/2021.





86-year-old male seen initially in consultation at [facility].  

Finally, on his third trip to the hospital, he was admitted, on the 27th.  He 

was admitted with a diagnosis of COVID 19 pneumonia.  I saw him in consultation 

on January 28, and he was transferred to [facility] on the 

29th.  Initially, the plan was for him to receive tocilizumab.  Unfortunately, 

it was not available.  Currently, the patient's on 8 L nasal cannula high flow 

oxygen with a saturation of 93-96%.  This is improved compared to when he was in

the intensive care unit.  The rest of his vital signs are stable.  There was no 

additional blood work today.  Chest x-ray today showed an elevation of the right

hemidiaphragm, and bilateral patchy infiltrates.





Objective





- Vital Signs


Vital signs: 


                                   Vital Signs











Temp  97.8 F   02/03/21 14:00


 


Pulse  58 L  02/03/21 14:00


 


Resp  18   02/03/21 14:00


 


BP  138/70   02/03/21 14:00


 


Pulse Ox  93 L  02/03/21 14:00








                                 Intake & Output











 02/02/21 02/03/21 02/03/21





 18:59 06:59 18:59


 


Intake Total 400  


 


Output Total 135  


 


Balance 265  


 


Weight  70 kg 


 


Intake:   


 


  Oral 400  


 


Output:   


 


  Urine 135  


 


Other:   


 


  Voiding Method Indwelling Catheter Bedside Commode 


 


  # Voids 2 4 


 


  # Bowel Movements 3 1 














- Exam





No acute distress, oriented, frequent wet cough, no obvious distress, no audible

wheezing, or use of accessory muscles.  Nasal cannula in place at 8 L/m.





HEENT examination is grossly unremarkable.  Mucous membranes are moist. 





Neck supple.  Full range of motion.  No adenopathy thyromegaly or neck vein 

distention.





Cardiovascular examination reveals regular rhythm rate.  S1-S2 normal.  No S3 or

S4.  No discernible murmur noted.  Heart sounds are distant and heart rate is 58

bpm.





Lungs reveal coarse bilateral rhonchi and a few scattered bilateral crackles.  

There are no wheezes.  Breath sounds equal bilaterally.  Breath sounds are 

unchanged.





Abdomen soft bowel sounds are heard.  No masses or tenderness.





Extremities are intact.  No cyanosis, clubbing or edema.





Skin is without rash or lesion.  Multiple areas of ecchymoses are noted.





Neurologic examination is brief but nonfocal.





- Labs


CBC & Chem 7: 


                                 02/02/21 03:25





                                 02/02/21 03:25


Labs: 


                  Abnormal Lab Results - Last 24 Hours (Table)











  02/02/21 02/02/21 02/03/21 Range/Units





  17:02 23:59 06:02 


 


POC Glucose (mg/dL)  161 H  202 H  159 H  (75-99)  mg/dL














  02/03/21 Range/Units





  11:36 


 


POC Glucose (mg/dL)  180 H  (75-99)  mg/dL














Assessment and Plan


Assessment: 





COVID 19 pneumonia/pneumonitis, with acute hypoxemic respiratory failure.





History of TIA.





History of CAD, with prior bypass grafting.





History of degenerative joint disease.





Hyperlipidemia.





Gastroesophageal reflux disease, without esophagitis.





History of mild intermittent asthma.





Prior history of right lower lobectomy.


Plan: 





Plan dated 02/01/2021.





The patient will continue on his current medications which include the vitamin 

cocktail, as well as GI and DVT prophylaxis.  I started the patient on 

colchicine when I saw him on January 28 at the other hospital.  We will continue

with oxygen therapy to maintain saturations above 90%.  We'll hoping that the 

patient gets tocilizumab if it becomes available.  The patient was beyond the 

window for the antiviral drug remdesivir.  Currently, the patient's prognosis is

guarded given his age and multiple medical problems.  Additional recommendations

and suggestions are forthcoming.  We will continue to follow closely.  Again, as

new information becomes available, anything we can do to help this patient josefina

vive this illness, will be done.





Plan dated 02/02/2021 





Currently, the patient appears to be doing a bit better.  He is down from 15 L 

high flow, to 8 L high flow nasal cannula.  The patient is not receiving any IV 

fluids.  He is currently not a candidate for tocilizumab.   The patient is 

stable for transfer to the general medical floor without telemetry.  We will 

continue with the cocktail of vitamins, as well as corticosteroids.  Additional 

recommendations and suggestions are forthcoming.  Prognosis is guarded.  Given 

his age, there is high risk for possible deterioration.  He would not be a good 

candidate for intubation and mechanical ventilation and that should be addressed

by the primary service.





Plan dated 02/03/2021.





Currently, the patient's on 8 L nasal cannula.  His saturations are reasonable 

between 93 and 96%.  He does not appear to be any significant respiratory 

distress.  The patient's receiving appropriate medication.  Unfortunately, 

tocilizumab is not available.  The rest of his medications are appropriate.  We 

will continue to follow.  Prognosis is guarded.  CODE STATUS needs to be 

addressed with this patient.  He would not do well on mechanical ventilation and

likely would not survive it should he end up on mechanical ventilation.


Time with Patient: Less than 30

## 2021-02-03 NOTE — PN
PROGRESS NOTE



DATE OF SERVICE:

02/03/2021



REASON FOR FOLLOWUP:

COVID-19 infection.



INTERVAL HISTORY:

The patient is currently afebrile. He is breathing slightly comfortably.  Denies having

any chest pain. Cough but no sputum. No abdominal pain or diarrhea has been reported.



PHYSICAL EXAMINATION:

Blood pressure 134/72 with a pulse of 66, temperature 97.4.  He is 94% on 8 L nasal

cannula.

General description is an elderly male up in the bed in no distress.

RESPIRATORY SYSTEM: Unlabored breathing with decreased intensity of breath sounds.  No

wheeze.

HEART: S1, S2.  Regular rate and rhythm.

ABDOMEN: Soft. No tenderness.



LABS:

The patient did have a chest x-ray that showed slight improvement.



DIAGNOSTIC IMPRESSION AND PLAN:

Patient with acute COVID-19 infection. Patient is currently covered with Lovenox, Solu-

Medrol, zinc sulfate; to continue along with respiratory support.  Monitor his clinical

course closely.





MMODL / IJN: [id number] / Job#: [id number]

## 2021-02-04 LAB
BASOPHILS # BLD AUTO: 0.1 K/UL (ref 0–0.2)
BASOPHILS NFR BLD AUTO: 0 %
EOSINOPHIL # BLD AUTO: 0 K/UL (ref 0–0.7)
EOSINOPHIL NFR BLD AUTO: 0 %
ERYTHROCYTE [DISTWIDTH] IN BLOOD BY AUTOMATED COUNT: 5.38 M/UL (ref 4.3–5.9)
ERYTHROCYTE [DISTWIDTH] IN BLOOD: 13.5 % (ref 11.5–15.5)
GLUCOSE BLD-MCNC: 133 MG/DL (ref 75–99)
GLUCOSE BLD-MCNC: 156 MG/DL (ref 75–99)
GLUCOSE BLD-MCNC: 170 MG/DL (ref 75–99)
GLUCOSE BLD-MCNC: 186 MG/DL (ref 75–99)
HCT VFR BLD AUTO: 48.1 % (ref 39–53)
HGB BLD-MCNC: 15.6 GM/DL (ref 13–17.5)
LYMPHOCYTES # SPEC AUTO: 0.9 K/UL (ref 1–4.8)
LYMPHOCYTES NFR SPEC AUTO: 5 %
MCH RBC QN AUTO: 28.9 PG (ref 25–35)
MCHC RBC AUTO-ENTMCNC: 32.4 G/DL (ref 31–37)
MCV RBC AUTO: 89.3 FL (ref 80–100)
MONOCYTES # BLD AUTO: 1 K/UL (ref 0–1)
MONOCYTES NFR BLD AUTO: 6 %
NEUTROPHILS # BLD AUTO: 14.1 K/UL (ref 1.3–7.7)
NEUTROPHILS NFR BLD AUTO: 87 %
PLATELET # BLD AUTO: 330 K/UL (ref 150–450)
WBC # BLD AUTO: 16.3 K/UL (ref 3.8–10.6)

## 2021-02-04 RX ADMIN — METHYLPREDNISOLONE SODIUM SUCCINATE SCH MG: 125 INJECTION, POWDER, FOR SOLUTION INTRAMUSCULAR; INTRAVENOUS at 17:28

## 2021-02-04 RX ADMIN — COLCHICINE SCH MG: 0.6 TABLET, FILM COATED ORAL at 08:59

## 2021-02-04 RX ADMIN — Medication SCH MG: at 20:02

## 2021-02-04 RX ADMIN — ATORVASTATIN CALCIUM SCH MG: 10 TABLET, FILM COATED ORAL at 20:02

## 2021-02-04 RX ADMIN — INSULIN ASPART SCH UNIT: 100 INJECTION, SOLUTION INTRAVENOUS; SUBCUTANEOUS at 23:49

## 2021-02-04 RX ADMIN — Medication SCH MG: at 08:58

## 2021-02-04 RX ADMIN — METHYLPREDNISOLONE SODIUM SUCCINATE SCH MG: 125 INJECTION, POWDER, FOR SOLUTION INTRAMUSCULAR; INTRAVENOUS at 00:05

## 2021-02-04 RX ADMIN — ALBUTEROL SULFATE PRN PUFF: 90 AEROSOL, METERED RESPIRATORY (INHALATION) at 16:03

## 2021-02-04 RX ADMIN — VENLAFAXINE HYDROCHLORIDE SCH MG: 150 CAPSULE, EXTENDED RELEASE ORAL at 08:58

## 2021-02-04 RX ADMIN — INSULIN ASPART SCH UNIT: 100 INJECTION, SOLUTION INTRAVENOUS; SUBCUTANEOUS at 11:34

## 2021-02-04 RX ADMIN — ALBUTEROL SULFATE PRN PUFF: 90 AEROSOL, METERED RESPIRATORY (INHALATION) at 07:29

## 2021-02-04 RX ADMIN — INSULIN ASPART SCH UNIT: 100 INJECTION, SOLUTION INTRAVENOUS; SUBCUTANEOUS at 17:29

## 2021-02-04 RX ADMIN — INSULIN ASPART SCH UNIT: 100 INJECTION, SOLUTION INTRAVENOUS; SUBCUTANEOUS at 06:06

## 2021-02-04 RX ADMIN — ENOXAPARIN SODIUM SCH MG: 40 INJECTION SUBCUTANEOUS at 08:59

## 2021-02-04 RX ADMIN — METHYLPREDNISOLONE SODIUM SUCCINATE SCH MG: 125 INJECTION, POWDER, FOR SOLUTION INTRAMUSCULAR; INTRAVENOUS at 11:34

## 2021-02-04 RX ADMIN — METHYLPREDNISOLONE SODIUM SUCCINATE SCH MG: 125 INJECTION, POWDER, FOR SOLUTION INTRAMUSCULAR; INTRAVENOUS at 06:06

## 2021-02-04 RX ADMIN — Medication SCH MCG: at 08:58

## 2021-02-04 RX ADMIN — COLCHICINE SCH MG: 0.6 TABLET, FILM COATED ORAL at 20:02

## 2021-02-04 RX ADMIN — ALBUTEROL SULFATE PRN PUFF: 90 AEROSOL, METERED RESPIRATORY (INHALATION) at 11:34

## 2021-02-04 RX ADMIN — ASPIRIN 81 MG CHEWABLE TABLET SCH MG: 81 TABLET CHEWABLE at 08:57

## 2021-02-04 RX ADMIN — CLOPIDOGREL BISULFATE SCH MG: 75 TABLET ORAL at 08:58

## 2021-02-04 RX ADMIN — ALBUTEROL SULFATE PRN PUFF: 90 AEROSOL, METERED RESPIRATORY (INHALATION) at 19:39

## 2021-02-04 RX ADMIN — INSULIN ASPART SCH UNIT: 100 INJECTION, SOLUTION INTRAVENOUS; SUBCUTANEOUS at 00:05

## 2021-02-04 RX ADMIN — EZETIMIBE SCH MG: 10 TABLET ORAL at 20:02

## 2021-02-04 RX ADMIN — TAMSULOSIN HYDROCHLORIDE SCH MG: 0.4 CAPSULE ORAL at 20:02

## 2021-02-04 RX ADMIN — TAMSULOSIN HYDROCHLORIDE SCH MG: 0.4 CAPSULE ORAL at 08:58

## 2021-02-04 RX ADMIN — ISOSORBIDE MONONITRATE SCH MG: 30 TABLET, EXTENDED RELEASE ORAL at 08:59

## 2021-02-04 RX ADMIN — OXYCODONE HYDROCHLORIDE AND ACETAMINOPHEN SCH MG: 500 TABLET ORAL at 08:58

## 2021-02-04 RX ADMIN — MONTELUKAST SODIUM SCH MG: 10 TABLET, FILM COATED ORAL at 20:02

## 2021-02-04 RX ADMIN — FAMOTIDINE SCH MG: 20 TABLET, FILM COATED ORAL at 08:58

## 2021-02-04 RX ADMIN — ACETAMINOPHEN PRN MG: 325 TABLET, FILM COATED ORAL at 20:02

## 2021-02-04 RX ADMIN — METHYLPREDNISOLONE SODIUM SUCCINATE SCH MG: 125 INJECTION, POWDER, FOR SOLUTION INTRAMUSCULAR; INTRAVENOUS at 23:50

## 2021-02-04 NOTE — P.PN
Subjective


Progress Note Date: 02/04/21





86-year-old the vanessa gentleman was transferred from [facility] for Covid 19 and treatment with Actemra, which is unfortunately not 

available at this time and this hospital.  Patient doesn't have any more fevers 

patient is on 15 L of high flow nasal cannula oxygen patient has bilateral r

honchi.  Patient is not a candidate for Remdesivir but patient is on Decadron, 

colchicine, zinc and ascorbic acid.  Patient denied any fever chills nausea 

vomiting abdominal pain dysuria.








01/31/2021


Patient remains on high flow nasal cannula oxygen.





02/01/2021 


Patient remains on 15 L of oxygen clinically doing well.





02/02/2021


Patient the is doing better today.  Patient is presently on 9 L of oxygen.





02/03/2021


Patient is seen and evaluated in follow-up continues to be on high flow oxygen 

and weaning FiO2 as tolerated.  Patient is presently on 8 L high flow.  Patient 

is maintained on Lovenox along with dexamethasone, culture seen, vitamin C and 

D, and zinc supplements.  Chest x-ray today shows continued patchy densities of 

the left lung base with some improvement in aeration.  Social work consulted the

possibility of ECF on discharge is patient continues to be weak.  PT/OT to evalu

ate the patient.





02/04/2021


Patient is seen this morning and is quite lethargic although arousable.  Patient

continues on 8 L high flow via nasal cannula and discuss with nursing staff 

about weaning FiO2 as tolerated.  Patient will work with physical therapy daily 

as he continues to be quite weak and will likely require subacute rehab upon 

discharge.  Will repeat a.m. labs and monitor inflammatory markers. Pulmonary 

following closely.  Patient continues to be on IV Solu-Medrol along with Loveno

x, colchicine him on vitamin C and D, and zinc supplements and will continue at 

this time.





Constitutional: Reports fatigue and generalized weakness


Cardio vascular: denied any chest pain, palpitations


Gastrointestinal denied any nausea vomiting


Pulmonary: Reports continued shortness of breath with exertion 


Neurologic denied any new focal deficits





All inpatient medications were reviewed and appropriate changes in these 

medications as dictated in the interval history and assessment and plan.








Objective





- Vital Signs


Vital signs: 


                                   Vital Signs











Temp  98.0 F   02/04/21 05:55


 


Pulse  60   02/04/21 05:55


 


Resp  18   02/04/21 05:55


 


BP  132/81   02/04/21 05:55


 


Pulse Ox  91 L  02/04/21 05:55








                                 Intake & Output











 02/03/21 02/04/21 02/04/21





 18:59 06:59 18:59


 


Weight  69 kg 


 


Other:   


 


  Voiding Method  Bedside Commode 


 


  # Voids 3 4 


 


  # Bowel Movements  2 














- Exam





GENERAL: The patient is alert and oriented x3, not in any acute distress. Well 

developed, well nourished. 


HEENT: Pupils are round and equally reacting to light. EOMI. No scleral icterus.

No conjunctival pallor. Normocephalic, atraumatic. No pharyngeal erythema. No 

thyromegaly. 


CARDIOVASCULAR: S1 and S2 present. No murmurs, rubs, or gallops. 


PULMONARY: Diffuse bilateral rhonchi with no wheezing noted on exam


ABDOMEN: Soft, nontender, nondistended, normoactive bowel sounds. No palpable 

organomegaly. 


MUSCULOSKELETAL: No joint swelling or deformity.


EXTREMITIES: No cyanosis, clubbing, or pedal edema. 


NEUROLOGICAL: Gross neurological examination did not reveal any focal deficits. 


SKIN: No rashes. 














- Labs


CBC & Chem 7: 


                                 02/02/21 03:25





                                 02/02/21 03:25


Labs: 


                  Abnormal Lab Results - Last 24 Hours (Table)











  02/03/21 02/03/21 02/03/21 Range/Units





  11:36 17:47 23:54 


 


POC Glucose (mg/dL)  180 H  182 H  239 H  (75-99)  mg/dL














  02/04/21 Range/Units





  06:02 


 


POC Glucose (mg/dL)  133 H  (75-99)  mg/dL














Assessment and Plan


Assessment: 





-Acute respiratory failure secondary to covid 19 pneumonia and patient is 

maintained on IV Solu-Medrol along with colchicine, vitamin C and D supplements 

along with zinc supplements, and Lovenox and to continue.  Weaning FiO2 as 

tolerated and remains on 8 L high flow and oxygen saturation is 91%.  Pulmonary 

following 


-Coronary artery disease status post coronary artery bypass grafting, stents


-CVA/TIA in the past 


-gastroesophageal reflux disease


-hyperlipidemia


-Hypertension


-Depression


-Seizure disorder for which patient is on Keppra which will be continued


-DVT prophylaxis with Lovenox


-GI prophylaxis with Pepcid


-Full code





Plan:


Continue with current medication regimen.  Continue to wean FiO2 as tolerated.  

PT/OT following.  Discussed with wife Isha about CODE STATUS and treatment 

plan and she would like to discuss with her children as she states they have 

never really discussed CODE STATUS previously and she knows patient would not 

want to be on a mechanical ventilator.  Will follow-up with family tomorrow 

about CODE STATUS.  Will repeat a.m. labs and monitor inflammatory markers.

## 2021-02-04 NOTE — P.PN
Subjective


Progress Note Date: 02/04/21


COVID 19 pneumonia





Progress note dated 02/01/2021.





This is an 86-year-old male that I actually saw in consultation on January 28, 

at [facility].  The patient presented to [facility] 

initially on January 19 and was tested positive for coronavirus infection.  At 

that time, the patient was relatively stable, and his chest x-ray was normal so 

he was discharged home.  He returned to the emergency room on January 22, and 

then again on the 27th.  Again, on the 22nd, his chest x-ray was relatively 

normal and he was not really having any respiratory issues.  The big change 

occurred on the 27th, with the patient's chest x-ray was clearly abnormal and 

the patient was quite hypoxemic.  I felt the patient was a good candidate for 

tocilizumab, and the patient was transferred to [facility] on 

January 29.  He has not yet received that drug as there is none available.  

Currently, the patient's on 15 L nasal cannula, and not on any IV fluids.  He 

was started on the antiviral drug, remdesivir, by infectious diseases, but he 

was clearly outside the window for that medication.  The patient has a history 

of TIA, coronary artery disease, status post bypass grafting, degenerative joint

disease, hyperlipidemia, mild asthma, and gastroesophageal reflux disease.  The 

patient also previously underwent right lower lobectomy for suspected lung 

cancer.





Progress note dated 2/2/2021.





86-year-old male that I initially saw in consultation at [facility].  Please see the note above.  The patient was at that hospital on 

January 19, and again on the 22nd.  He went there third time on the 27th, and 

was admitted.  He tested positive for COVID 19 on the 19th.  Initially, he was 

not having any respiratory issues and his chest x-ray was normal.  On his final 

trip to the hospital, he was short of breath, hypoxemic, and his chest x-ray had

dramatically changed.  Hence he was admitted.  The patient was transferred here 

for tocilizumab, which unfortunately, is not available to the patient's here.  

The patient is doing better.  Yesterday, he was on 15 L nasal cannula.  He is 

not receiving any IV fluids.  Today he is down to 8 L.  His white count is 10.4,

hemoglobin 14.2, platelet count 295,000.  The d-dimer level is 0.65.  Sodium is 

141, potassium 4.8, chlorides 109, CO2 22, anion gap 10, BUN 55, and creatinine 

0.92.





Progress note dated 02/03/2021.





86-year-old male seen initially in consultation at [facility].  

Finally, on his third trip to the hospital, he was admitted, on the 27th.  He 

was admitted with a diagnosis of COVID 19 pneumonia.  I saw him in consultation 

on January 28, and he was transferred to [facility] on the 

29th.  Initially, the plan was for him to receive tocilizumab.  Unfortunately, 

it was not available.  Currently, the patient's on 8 L nasal cannula high flow 

oxygen with a saturation of 93-96%.  This is improved compared to when he was in

the intensive care unit.  The rest of his vital signs are stable.  There was no 

additional blood work today.  Chest x-ray today showed an elevation of the right

hemidiaphragm, and bilateral patchy infiltrates.





On 02/04/2021 patient seen in follow-up medical floor, he looks weak, 

debilitated, is currently on 8 L of oxygen his pulse ox is between 91-96%, his 

been afebrile, his last chest x-ray was yesterday showing elevation of the right

hemidiaphragm, patchy density at the left lung base and there has been no noted 

improvement in aeration.  Continues on IV Solu-Medrol, vitamins, his 

inflammatory markers are improving, his last d-dimer was 0.65.  Is on 

prophylactic dose Lovenox.  CT chest pain, or worsening dyspnea, no vomiting, 

abdominal pain or diarrhea.








Objective





- Vital Signs


Vital signs: 


                                   Vital Signs











Temp  97.6 F   02/04/21 10:00


 


Pulse  70   02/04/21 10:00


 


Resp  20   02/04/21 10:00


 


BP  164/74   02/04/21 10:00


 


Pulse Ox  92 L  02/04/21 10:00








                                 Intake & Output











 02/03/21 02/04/21 02/04/21





 18:59 06:59 18:59


 


Weight  69 kg 


 


Other:   


 


  Voiding Method  Bedside Commode Bedside Commode


 


  # Voids 3 4 1


 


  # Bowel Movements  2 














- Exam


 GENERAL EXAM: Alert, very pleasant, 86 showed old white female, on 8 L of 

oxygen a pulse ox between 91-96% appears comfortable, breathing comfortably, 

however.  Very weak and debilitated resting quietly in bed 


HEAD: Normocephalic/atraumatic.


EYES: Normal reaction of pupils, equal size.  Conjunctiva pink, sclera white.


NOSE: Clear with pink turbinates.


THROAT: No erythema or exudates.


NECK: No masses, no JVD, no thyroid enlargement, no adenopathy.


CHEST: No chest wall deformity.  Symmetrical expansion. 


LUNGS: Equal air entry with no crackles, wheeze, rhonchi or dullness.


CVS: Regular rate and rhythm, normal S1 and S2, no gallops, no murmurs, no rubs


ABDOMEN: Soft, nontender.  No hepatosplenomegaly, normal bowel sounds, no 

guarding or rigidity.


EXTREMITIES: No clubbing, no edema, no cyanosis, 2+ pulses and upper and lower 

extremities.


MUSCULOSKELETAL: Muscle strength and tone normal.


SPINE: No scoliosis or deformity


SKIN: No rashes


CENTRAL NERVOUS SYSTEM: Alert and oriented -3.  No focal deficits, tone is 

normal in all 4 extremities.


PSYCHIATRIC: Alert and oriented -3.  Appropriate affect.  Intact judgment and 

insight.











- Labs


CBC & Chem 7: 


                                 02/02/21 03:25





                                 02/02/21 03:25


Labs: 


                  Abnormal Lab Results - Last 24 Hours (Table)











  02/03/21 02/03/21 02/04/21 Range/Units





  17:47 23:54 06:02 


 


POC Glucose (mg/dL)  182 H  239 H  133 H  (75-99)  mg/dL














  02/04/21 Range/Units





  11:23 


 


POC Glucose (mg/dL)  170 H  (75-99)  mg/dL














Assessment and Plan


Plan: 


  Assessment:





#1.  Acute hypoxic respiratory failure secondary to COVID 19 pneumonia, patient 

is maintained on steroids, prophylactic dose Lovenox, vitamin C, D and zinc 

supplements.  Patient was outside the window for Remdesivir, was transferred 

from the [facility] for possible Tocilizumab infusion 

which is not currently approved for use in this facility and is not available





#2.  Increased inflammatory markers related to COVID 19 infection, improving





#3.  Coronary artery disease status post bypass grafting and stenting





#4.  History of CVA/TIA in the past





#5.  Hypertension





#6.  Hyperlipidemia





#7.  Seizure disorder on Keppra





Plan:





Continue current dose steroids, vitamins, continue prophylactic dose Lovenox, 

wean FiO2, maintain aspiration precautions, physically patient is very weak and 

debilitated, but has had no fever or chills, breathing comfortably, appears to 

be in no acute distress.  Recommend addressing patient's CODE STATUS and 

clarifying his wishes for medical treatment if his condition should deteriorate.

Unfortunately he was beyond the window for Remdesivir, and Tocilizumab is not 

currently an approved agent for COVID 19 pneumonia at this facility until 

further review. And it is not available. Will continue supportive treatment, 

prognosis guarded





I performed a history & physical examination of the patient and discussed their 

management with my nurse practitioner, Jayashree Pace.  I reviewed the nurse 

practitioner's note and agree with the documented findings and plan of care.  

Lung sounds are positive fordiffuse cracklesthroughout the lung fields.  The 

findings and the impression was discussed with the patient.  I attest to the 

documentation by the nurse practitioner. 





Time with Patient: Less than 30

## 2021-02-04 NOTE — PN
PROGRESS NOTE



DATE OF SERVICE:

02/04/2021



REASON FOR FOLLOWUP:

COVID-19 infection.



INTERVAL HISTORY:

The patient is currently afebrile.  The patient is on high-flow oxygen. Patient is hard

of hearing, has been hard to communicate with him, though denies any chest pain. Did

have some cough. No sputum. No abdominal pain. No diarrhea reported.



PHYSICAL EXAMINATION:

Blood pressure 164/74, pulse of 70, temperature is 97.6. He is 92% on 8 L high-flow

oxygen.

General description is an elderly male lying in bed in no distress.

RESPIRATORY SYSTEM: Unlabored breathing with decreased intensity of breath sounds.  No

wheeze.

HEART: S1, S2.  Regular rate and rhythm.

ABDOMEN:  Soft, no tenderness.



LABS:

No new labs have been obtained today.



DIAGNOSTIC IMPRESSION AND PLAN:

Patient with acute COVID-19 infection in this patient considered to be out of

therapeutic window for remdesivir was transferred to this facility for Actemra, not

available, not given.  Currently on steroids, Lovenox and zinc to continue along with

respiratory support and monitor clinical course closely.





MMODL / IJN: [id number] / Job#: [id number]

## 2021-02-05 LAB
ANION GAP SERPL CALC-SCNC: 11.5 MMOL/L (ref 4–12)
BASOPHILS # BLD AUTO: 0.02 X 10*3/UL (ref 0–0.1)
BASOPHILS NFR BLD AUTO: 0.1 %
BUN SERPL-SCNC: 87 MG/DL (ref 9–27)
BUN/CREAT SERPL: 58 RATIO (ref 12–20)
CALCIUM SPEC-MCNC: 9.3 MG/DL (ref 8.7–10.3)
CHLORIDE SERPL-SCNC: 114 MMOL/L (ref 96–109)
CO2 SERPL-SCNC: 21.5 MMOL/L (ref 21.6–31.8)
EOSINOPHIL # BLD AUTO: 0 X 10*3/UL (ref 0.04–0.35)
EOSINOPHIL NFR BLD AUTO: 0 %
ERYTHROCYTE [DISTWIDTH] IN BLOOD BY AUTOMATED COUNT: 5.53 X 10*6/UL (ref 4.4–5.6)
ERYTHROCYTE [DISTWIDTH] IN BLOOD: 14.1 % (ref 11.5–14.5)
GLUCOSE BLD-MCNC: 162 MG/DL (ref 75–99)
GLUCOSE BLD-MCNC: 179 MG/DL (ref 75–99)
GLUCOSE BLD-MCNC: 180 MG/DL (ref 75–99)
GLUCOSE BLD-MCNC: 203 MG/DL (ref 75–99)
GLUCOSE SERPL-MCNC: 211 MG/DL (ref 70–110)
HCT VFR BLD AUTO: 50.7 % (ref 39.6–50)
HGB BLD-MCNC: 15.8 G/DL (ref 13–17)
LDH SPEC-CCNC: 300 U/L (ref 120–246)
LYMPHOCYTES # SPEC AUTO: 1.16 X 10*3/UL (ref 0.9–5)
LYMPHOCYTES NFR SPEC AUTO: 8.3 %
MCH RBC QN AUTO: 28.6 PG (ref 27–32)
MCHC RBC AUTO-ENTMCNC: 31.2 G/DL (ref 32–37)
MCV RBC AUTO: 91.7 FL (ref 80–97)
MONOCYTES # BLD AUTO: 0.71 X 10*3/UL (ref 0.2–1)
MONOCYTES NFR BLD AUTO: 5.1 %
NEUTROPHILS # BLD AUTO: 12.01 X 10*3/UL (ref 1.8–7.7)
NEUTROPHILS NFR BLD AUTO: 85.6 %
PLATELET # BLD AUTO: 269 X 10*3/UL (ref 140–440)
POTASSIUM SERPL-SCNC: 4.8 MMOL/L (ref 3.5–5.5)
SODIUM SERPL-SCNC: 147 MMOL/L (ref 135–145)
WBC # BLD AUTO: 14.02 X 10*3/UL (ref 4.5–10)

## 2021-02-05 RX ADMIN — Medication SCH MCG: at 08:30

## 2021-02-05 RX ADMIN — ACETAMINOPHEN PRN MG: 325 TABLET, FILM COATED ORAL at 12:19

## 2021-02-05 RX ADMIN — Medication SCH MG: at 21:29

## 2021-02-05 RX ADMIN — Medication SCH MG: at 08:30

## 2021-02-05 RX ADMIN — COLCHICINE SCH MG: 0.6 TABLET, FILM COATED ORAL at 21:29

## 2021-02-05 RX ADMIN — ASPIRIN 81 MG CHEWABLE TABLET SCH MG: 81 TABLET CHEWABLE at 08:30

## 2021-02-05 RX ADMIN — METHYLPREDNISOLONE SODIUM SUCCINATE SCH MG: 125 INJECTION, POWDER, FOR SOLUTION INTRAMUSCULAR; INTRAVENOUS at 05:35

## 2021-02-05 RX ADMIN — INSULIN ASPART SCH UNIT: 100 INJECTION, SOLUTION INTRAVENOUS; SUBCUTANEOUS at 23:44

## 2021-02-05 RX ADMIN — INSULIN ASPART SCH UNIT: 100 INJECTION, SOLUTION INTRAVENOUS; SUBCUTANEOUS at 12:19

## 2021-02-05 RX ADMIN — METHYLPREDNISOLONE SODIUM SUCCINATE SCH MG: 125 INJECTION, POWDER, FOR SOLUTION INTRAMUSCULAR; INTRAVENOUS at 18:18

## 2021-02-05 RX ADMIN — METHYLPREDNISOLONE SODIUM SUCCINATE SCH MG: 125 INJECTION, POWDER, FOR SOLUTION INTRAMUSCULAR; INTRAVENOUS at 23:44

## 2021-02-05 RX ADMIN — MONTELUKAST SODIUM SCH MG: 10 TABLET, FILM COATED ORAL at 21:29

## 2021-02-05 RX ADMIN — ATORVASTATIN CALCIUM SCH MG: 10 TABLET, FILM COATED ORAL at 21:28

## 2021-02-05 RX ADMIN — ISOSORBIDE MONONITRATE SCH MG: 30 TABLET, EXTENDED RELEASE ORAL at 08:30

## 2021-02-05 RX ADMIN — OXYCODONE HYDROCHLORIDE AND ACETAMINOPHEN SCH MG: 500 TABLET ORAL at 08:29

## 2021-02-05 RX ADMIN — METHYLPREDNISOLONE SODIUM SUCCINATE SCH MG: 125 INJECTION, POWDER, FOR SOLUTION INTRAMUSCULAR; INTRAVENOUS at 12:18

## 2021-02-05 RX ADMIN — ENOXAPARIN SODIUM SCH MG: 30 INJECTION SUBCUTANEOUS at 08:31

## 2021-02-05 RX ADMIN — EZETIMIBE SCH MG: 10 TABLET ORAL at 21:29

## 2021-02-05 RX ADMIN — FAMOTIDINE SCH MG: 20 TABLET, FILM COATED ORAL at 08:30

## 2021-02-05 RX ADMIN — CLOPIDOGREL BISULFATE SCH MG: 75 TABLET ORAL at 08:31

## 2021-02-05 RX ADMIN — TAMSULOSIN HYDROCHLORIDE SCH MG: 0.4 CAPSULE ORAL at 21:29

## 2021-02-05 RX ADMIN — TAMSULOSIN HYDROCHLORIDE SCH MG: 0.4 CAPSULE ORAL at 08:30

## 2021-02-05 RX ADMIN — INSULIN ASPART SCH UNIT: 100 INJECTION, SOLUTION INTRAVENOUS; SUBCUTANEOUS at 18:17

## 2021-02-05 RX ADMIN — COLCHICINE SCH MG: 0.6 TABLET, FILM COATED ORAL at 08:32

## 2021-02-05 RX ADMIN — VENLAFAXINE HYDROCHLORIDE SCH MG: 150 CAPSULE, EXTENDED RELEASE ORAL at 08:30

## 2021-02-05 RX ADMIN — ALBUTEROL SULFATE PRN PUFF: 90 AEROSOL, METERED RESPIRATORY (INHALATION) at 15:47

## 2021-02-05 RX ADMIN — INSULIN ASPART SCH UNIT: 100 INJECTION, SOLUTION INTRAVENOUS; SUBCUTANEOUS at 05:35

## 2021-02-05 NOTE — P.PN
Subjective


Progress Note Date: 02/05/21


COVID 19 pneumonia





Progress note dated 02/01/2021.





This is an 86-year-old male that I actually saw in consultation on January 28, 

at [facility].  The patient presented to [facility] 

initially on January 19 and was tested positive for coronavirus infection.  At 

that time, the patient was relatively stable, and his chest x-ray was normal so 

he was discharged home.  He returned to the emergency room on January 22, and 

then again on the 27th.  Again, on the 22nd, his chest x-ray was relatively 

normal and he was not really having any respiratory issues.  The big change 

occurred on the 27th, with the patient's chest x-ray was clearly abnormal and 

the patient was quite hypoxemic.  I felt the patient was a good candidate for 

tocilizumab, and the patient was transferred to [facility] on 

January 29.  He has not yet received that drug as there is none available.  

Currently, the patient's on 15 L nasal cannula, and not on any IV fluids.  He 

was started on the antiviral drug, remdesivir, by infectious diseases, but he 

was clearly outside the window for that medication.  The patient has a history 

of TIA, coronary artery disease, status post bypass grafting, degenerative joint

disease, hyperlipidemia, mild asthma, and gastroesophageal reflux disease.  The 

patient also previously underwent right lower lobectomy for suspected lung 

cancer.





Progress note dated 2/2/2021.





86-year-old male that I initially saw in consultation at [facility].  Please see the note above.  The patient was at that hospital on 

January 19, and again on the 22nd.  He went there third time on the 27th, and 

was admitted.  He tested positive for COVID 19 on the 19th.  Initially, he was 

not having any respiratory issues and his chest x-ray was normal.  On his final 

trip to the hospital, he was short of breath, hypoxemic, and his chest x-ray had

dramatically changed.  Hence he was admitted.  The patient was transferred here 

for tocilizumab, which unfortunately, is not available to the patient's here.  

The patient is doing better.  Yesterday, he was on 15 L nasal cannula.  He is 

not receiving any IV fluids.  Today he is down to 8 L.  His white count is 10.4,

hemoglobin 14.2, platelet count 295,000.  The d-dimer level is 0.65.  Sodium is 

141, potassium 4.8, chlorides 109, CO2 22, anion gap 10, BUN 55, and creatinine 

0.92.





Progress note dated 02/03/2021.





86-year-old male seen initially in consultation at [facility].  

Finally, on his third trip to the hospital, he was admitted, on the 27th.  He 

was admitted with a diagnosis of COVID 19 pneumonia.  I saw him in consultation 

on January 28, and he was transferred to [facility] on the 

29th.  Initially, the plan was for him to receive tocilizumab.  Unfortunately, 

it was not available.  Currently, the patient's on 8 L nasal cannula high flow 

oxygen with a saturation of 93-96%.  This is improved compared to when he was in

the intensive care unit.  The rest of his vital signs are stable.  There was no 

additional blood work today.  Chest x-ray today showed an elevation of the right

hemidiaphragm, and bilateral patchy infiltrates.





On 02/04/2021 patient seen in follow-up medical floor, he looks weak, 

debilitated, is currently on 8 L of oxygen his pulse ox is between 91-96%, his 

been afebrile, his last chest x-ray was yesterday showing elevation of the right

hemidiaphragm, patchy density at the left lung base and there has been no noted 

improvement in aeration.  Continues on IV Solu-Medrol, vitamins, his 

inflammatory markers are improving, his last d-dimer was 0.65.  Is on 

prophylactic dose Lovenox.  CT chest pain, or worsening dyspnea, no vomiting, 

abdominal pain or diarrhea.





On 02/05/2021 patient seen in follow-up on medical floor, he appears to be 

pretty lethargic, he remains on high flow oxygen, currently at 8 L oxygen is 90-

93%, hemodynamically has been stable, has been afebrile, clinically seems to be 

very fatigued, worn out, he continues on high-dose IV Solu-Medrol, Lovenox, 

patient was out of the window for Remdesivir, this facility is not using Actemra

for COVID 19 pneumonitis.  Patient continues to have shortness of breath, he is 

been mostly bedbound.  No nausea vomiting or diarrhea, his labs have been 

reviewed, d-dimer 0.62, creatinine is 1.5,  which has improved since 

admission.  White blood cell count is 14, hemoglobin is 15.8








Objective





- Vital Signs


Vital signs: 


                                   Vital Signs











Temp  97.9 F   02/05/21 10:00


 


Pulse  89   02/05/21 10:00


 


Resp  16   02/05/21 10:00


 


BP  152/81   02/05/21 10:00


 


Pulse Ox  93 L  02/05/21 10:00








                                 Intake & Output











 02/04/21 02/05/21 02/05/21





 18:59 06:59 18:59


 


Weight 69 kg  


 


Other:   


 


  Voiding Method Bedside Commode Bedside Commode Bedside Commode


 


  # Voids 1 2 


 


  # Bowel Movements  1 1














- Exam


 GENERAL EXAM: fatigued, worn out, lethargic, 86 old white male, on 8 L of 

oxygen a pulse ox between 91-96% appears very weak and debilitated resting 

quietly in bed 


HEAD: Normocephalic/atraumatic.


EYES: Normal reaction of pupils, equal size.  Conjunctiva pink, sclera white.


NOSE: Clear with pink turbinates.


THROAT: No erythema or exudates.


NECK: No masses, no JVD, no thyroid enlargement, no adenopathy.


CHEST: No chest wall deformity.  Symmetrical expansion. 


LUNGS: Equal air entry with no crackles, wheeze, rhonchi or dullness.


CVS: Regular rate and rhythm, normal S1 and S2, no gallops, no murmurs, no rubs


ABDOMEN: Soft, nontender.  No hepatosplenomegaly, normal bowel sounds, no 

guarding or rigidity.


EXTREMITIES: No clubbing, no edema, no cyanosis, 2+ pulses and upper and lower 

extremities.


MUSCULOSKELETAL: Muscle strength and tone normal.


SPINE: No scoliosis or deformity


SKIN: No rashes


CENTRAL NERVOUS SYSTEM: Lethargic.  No focal deficits, tone is normal in all 4 

extremities.

















- Labs


CBC & Chem 7: 


                                 02/05/21 06:08





                                 02/05/21 06:08


Labs: 


                  Abnormal Lab Results - Last 24 Hours (Table)











  02/04/21 02/04/21 02/04/21 Range/Units





  15:59 17:25 23:45 


 


WBC  16.3 H    (3.8-10.6)  k/uL


 


Hct     (39.6-50.0)  %


 


MCHC     (32.0-37.0)  g/dL


 


Immature Gran #     (0.00-0.04)  X 10*3/uL


 


Neutrophils #  14.1 H    (1.3-7.7)  k/uL


 


Lymphocytes #  0.9 L    (1.0-4.8)  k/uL


 


Eosinophils #     (0.04-0.35)  X 10*3/uL


 


D-Dimer     (<0.60)  mg/L FEU


 


Sodium     (135-145)  mmol/L


 


Chloride     ()  mmol/L


 


Carbon Dioxide     (21.6-31.8)  mmol/L


 


BUN     (9.0-27.0)  mg/dL


 


Est GFR (CKD-EPI)AfAm     (60.0-200.0)   


 


Est GFR (CKD-EPI)NonAf     (60.0-200.0)   


 


BUN/Creatinine Ratio     (12.00-20.00)  Ratio


 


Glucose     ()  mg/dL


 


POC Glucose (mg/dL)   156 H  186 H  (75-99)  mg/dL


 


Lactate Dehydrogenase     (120-246)  U/L














  02/05/21 02/05/21 02/05/21 Range/Units





  05:30 06:08 06:08 


 


WBC   14.02 H   (3.8-10.6)  k/uL


 


Hct   50.7 H   (39.6-50.0)  %


 


MCHC   31.2 L   (32.0-37.0)  g/dL


 


Immature Gran #   0.12 H   (0.00-0.04)  X 10*3/uL


 


Neutrophils #   12.01 H   (1.3-7.7)  k/uL


 


Lymphocytes #     (1.0-4.8)  k/uL


 


Eosinophils #   0 L   (0.04-0.35)  X 10*3/uL


 


D-Dimer    0.62 H  (<0.60)  mg/L FEU


 


Sodium     (135-145)  mmol/L


 


Chloride     ()  mmol/L


 


Carbon Dioxide     (21.6-31.8)  mmol/L


 


BUN     (9.0-27.0)  mg/dL


 


Est GFR (CKD-EPI)AfAm     (60.0-200.0)   


 


Est GFR (CKD-EPI)NonAf     (60.0-200.0)   


 


BUN/Creatinine Ratio     (12.00-20.00)  Ratio


 


Glucose     ()  mg/dL


 


POC Glucose (mg/dL)  203 H    (75-99)  mg/dL


 


Lactate Dehydrogenase     (120-246)  U/L














  02/05/21 02/05/21 Range/Units





  06:08 11:58 


 


WBC    (3.8-10.6)  k/uL


 


Hct    (39.6-50.0)  %


 


MCHC    (32.0-37.0)  g/dL


 


Immature Gran #    (0.00-0.04)  X 10*3/uL


 


Neutrophils #    (1.3-7.7)  k/uL


 


Lymphocytes #    (1.0-4.8)  k/uL


 


Eosinophils #    (0.04-0.35)  X 10*3/uL


 


D-Dimer    (<0.60)  mg/L FEU


 


Sodium  147 H   (135-145)  mmol/L


 


Chloride  114 H   ()  mmol/L


 


Carbon Dioxide  21.5 L   (21.6-31.8)  mmol/L


 


BUN  87.0 H   (9.0-27.0)  mg/dL


 


Est GFR (CKD-EPI)AfAm  48.2 L   (60.0-200.0)   


 


Est GFR (CKD-EPI)NonAf  41.6 L   (60.0-200.0)   


 


BUN/Creatinine Ratio  58.00 H   (12.00-20.00)  Ratio


 


Glucose  211 H   ()  mg/dL


 


POC Glucose (mg/dL)   180 H  (75-99)  mg/dL


 


Lactate Dehydrogenase  300 H   (120-246)  U/L














Assessment and Plan


Plan: 


  Assessment:





#1.  Acute hypoxic respiratory failure secondary to COVID 19 pneumonia, patient 

is maintained on steroids, prophylactic dose Lovenox, vitamin C, D and zinc 

supplements.  Patient was outside the window for Remdesivir, was transferred 

from the [facility] for possible Tocilizumab infusion 

which is not currently approved for use in this facility and is not available





#2.  Increased inflammatory markers related to COVID 19 infection, improving





#3.  Coronary artery disease status post bypass grafting and stenting





#4.  History of CVA/TIA in the past





#5.  Hypertension





#6.  Hyperlipidemia





#7.  Seizure disorder on Keppra





Plan:





Continue current dose Lovenox, continue IV Solu-Medrol, continue vitamin C, D 

and zinc, will continue supportive treatment.  Clinically patient appears to be 

very weak, debilitated.  Titrate FiO2 to maintain O2 sat at or above 90%, may 

place on BiPAP support if develops respiratory fatigue.  Overall prognosis is 

very guarded.  We are told by the nursing staff that the patient's family is 

expected to come in and discuss possibility of hospice. 





I performed a history & physical examination of the patient and discussed their 

management with my nurse practitioner, Jayashree Pace.  I reviewed the nurse 

practitioner's note and agree with the documented findings and plan of care.  

Lung sounds are positive fordiffuse cracklesthroughout the lung fields.  The 

findings and the impression was discussed with the patient.  I attest to the 

documentation by the nurse practitioner. 





Time with Patient: Less than 30

## 2021-02-05 NOTE — P.PN
Subjective


Progress Note Date: 02/05/21





HISTORY OF PRESENT ILLNESS


This is an 86-year-old male admitted to the hospital for COVID 19 infection area

patient considered out of the therapeutic window for Remdesivir and was 

transferred to this facility for Actemra which is not available here.  Patient 

is maintained on Lovenox, Solu-Medrol, supplements. Patient is afebrile, heart 

rate 89, blood pressure 152/81, pulse ox 93% on 8 L high flow nasal cannula.  

Repeat blood work reveals WBC 14, hemoglobin 15.8.  D-dimer 0.62.  Creatinine 

1.5.  . Patient's wife and son are at bedside.  He continues to have 

shortness of breath.  He has been afebrile.  He denies having any chest pain.  

No abdominal pain or diarrhea.


   


PHYSICAL EXAMINATION


Gen: This is an 86-year-old  male.  He is resting in bed and appears to

be somewhat comfortable at rest. 


HEENT: Head is atraumatic, normocephalic. Pupils equal, round. Sclerae is 

anicteric. 


NECK: Supple. No JVD. No lymphadenopathy. 


LUNGS: Decreased breath sounds bilaterally.  No wheezing.  No intercostal 

retractions.


HEART: Regular rate and rhythm.  


ABDOMEN: Soft. Bowel sounds are present. No masses.  No tenderness.








ASSESSMENT


Covid 19 infection








PLAN


Continue Lovenox, Solu-Medrol, supplements


Patient is outside the window for Remdesivir








The above dictated assessment and findings were discussed with Dr. Zurita.  The 

impression and plan of care have been directed as dictated.  Becky Urbina nurse 

practitioner acting as scribe for Dr. Zurita.








Objective





- Vital Signs


Vital signs: 


                                   Vital Signs











Temp  98.0 F   02/05/21 05:30


 


Pulse  58 L  02/05/21 05:30


 


Resp  18   02/05/21 05:30


 


BP  116/68   02/05/21 05:30


 


Pulse Ox  90 L  02/05/21 05:30








                                 Intake & Output











 02/04/21 02/05/21 02/05/21





 18:59 06:59 18:59


 


Weight 69 kg  


 


Other:   


 


  Voiding Method Bedside Commode Bedside Commode Bedside Commode


 


  # Voids 1 2 


 


  # Bowel Movements  1 1














- Labs


CBC & Chem 7: 


                                 02/05/21 06:08





                                 02/05/21 06:08


Labs: 


                  Abnormal Lab Results - Last 24 Hours (Table)











  02/04/21 02/04/21 02/04/21 Range/Units





  15:59 17:25 23:45 


 


WBC  16.3 H    (3.8-10.6)  k/uL


 


Hct     (39.6-50.0)  %


 


MCHC     (32.0-37.0)  g/dL


 


Immature Gran #     (0.00-0.04)  X 10*3/uL


 


Neutrophils #  14.1 H    (1.3-7.7)  k/uL


 


Lymphocytes #  0.9 L    (1.0-4.8)  k/uL


 


Eosinophils #     (0.04-0.35)  X 10*3/uL


 


D-Dimer     (<0.60)  mg/L FEU


 


Sodium     (135-145)  mmol/L


 


Chloride     ()  mmol/L


 


Carbon Dioxide     (21.6-31.8)  mmol/L


 


BUN     (9.0-27.0)  mg/dL


 


Est GFR (CKD-EPI)AfAm     (60.0-200.0)   


 


Est GFR (CKD-EPI)NonAf     (60.0-200.0)   


 


BUN/Creatinine Ratio     (12.00-20.00)  Ratio


 


Glucose     ()  mg/dL


 


POC Glucose (mg/dL)   156 H  186 H  (75-99)  mg/dL


 


Lactate Dehydrogenase     (120-246)  U/L














  02/05/21 02/05/21 02/05/21 Range/Units





  05:30 06:08 06:08 


 


WBC   14.02 H   (3.8-10.6)  k/uL


 


Hct   50.7 H   (39.6-50.0)  %


 


MCHC   31.2 L   (32.0-37.0)  g/dL


 


Immature Gran #   0.12 H   (0.00-0.04)  X 10*3/uL


 


Neutrophils #   12.01 H   (1.3-7.7)  k/uL


 


Lymphocytes #     (1.0-4.8)  k/uL


 


Eosinophils #   0 L   (0.04-0.35)  X 10*3/uL


 


D-Dimer    0.62 H  (<0.60)  mg/L FEU


 


Sodium     (135-145)  mmol/L


 


Chloride     ()  mmol/L


 


Carbon Dioxide     (21.6-31.8)  mmol/L


 


BUN     (9.0-27.0)  mg/dL


 


Est GFR (CKD-EPI)AfAm     (60.0-200.0)   


 


Est GFR (CKD-EPI)NonAf     (60.0-200.0)   


 


BUN/Creatinine Ratio     (12.00-20.00)  Ratio


 


Glucose     ()  mg/dL


 


POC Glucose (mg/dL)  203 H    (75-99)  mg/dL


 


Lactate Dehydrogenase     (120-246)  U/L














  02/05/21 02/05/21 Range/Units





  06:08 11:58 


 


WBC    (3.8-10.6)  k/uL


 


Hct    (39.6-50.0)  %


 


MCHC    (32.0-37.0)  g/dL


 


Immature Gran #    (0.00-0.04)  X 10*3/uL


 


Neutrophils #    (1.3-7.7)  k/uL


 


Lymphocytes #    (1.0-4.8)  k/uL


 


Eosinophils #    (0.04-0.35)  X 10*3/uL


 


D-Dimer    (<0.60)  mg/L FEU


 


Sodium  147 H   (135-145)  mmol/L


 


Chloride  114 H   ()  mmol/L


 


Carbon Dioxide  21.5 L   (21.6-31.8)  mmol/L


 


BUN  87.0 H   (9.0-27.0)  mg/dL


 


Est GFR (CKD-EPI)AfAm  48.2 L   (60.0-200.0)   


 


Est GFR (CKD-EPI)NonAf  41.6 L   (60.0-200.0)   


 


BUN/Creatinine Ratio  58.00 H   (12.00-20.00)  Ratio


 


Glucose  211 H   ()  mg/dL


 


POC Glucose (mg/dL)   180 H  (75-99)  mg/dL


 


Lactate Dehydrogenase  300 H   (120-246)  U/L

## 2021-02-05 NOTE — P.PN
Subjective


Progress Note Date: 02/05/21 02/05/2021 


patient is seen and evaluated in follow-up on medical floor, he appears to be 

pretty lethargic, he remains on high flow oxygen, currently at 8 L oxygen is 90-

93%, hemodynamically has been stable, has been afebrile, clinically seems to be 

very fatigued, worn out, he continues on high-dose IV Solu-Medrol, Lovenox, 

patient was out of the window for Remdesivir, this facility is not using Actemra

for COVID 19 pneumonitis.  Patient continues to have shortness of breath, he is 

been mostly bedbound.  No nausea vomiting or diarrhea, his labs have been 

reviewed, d-dimer 0.62, creatinine is 1.5,  which has improved since 

admission.  White blood cell count is 14, hemoglobin is 15.8


Patient will remain on current dose Lovenox, continue IV Solu-Medrol, continue 

vitamin C, D and zinc, will continue supportive treatment.  Clinically patient 

appears to be very weak, debilitated.  Titrate FiO2 to maintain O2 sat at or 

above 90%, may place on BiPAP support if develops respiratory fatigue.  Overall 

prognosis is very guarded.  Patient's family is expected to come in and discuss 

possibility of hospice. 











Objective





- Vital Signs


Vital signs: 


                                   Vital Signs











Temp  97.9 F   02/05/21 10:00


 


Pulse  89   02/05/21 10:00


 


Resp  16   02/05/21 10:00


 


BP  152/81   02/05/21 10:00


 


Pulse Ox  93 L  02/05/21 10:00








                                 Intake & Output











 02/04/21 02/05/21 02/05/21





 18:59 06:59 18:59


 


Weight 69 kg  


 


Other:   


 


  Voiding Method Bedside Commode Bedside Commode Bedside Commode


 


  # Voids 1 2 


 


  # Bowel Movements  1 1














- Exam


GENERAL EXAM: fatigued, worn out, lethargic, 86 old white male, on 8 L of oxygen

a pulse ox between 91-96% appears very weak and debilitated resting quietly in 

bed 


HEAD: Normocephalic/atraumatic.


EYES: Normal reaction of pupils, equal size.  Conjunctiva pink, sclera white.


NOSE: Clear with pink turbinates.


THROAT: No erythema or exudates.


NECK: No masses, no JVD, no thyroid enlargement, no adenopathy.


CHEST: No chest wall deformity.  Symmetrical expansion. 


LUNGS: Equal air entry with no crackles, wheeze, rhonchi or dullness.


CVS: Regular rate and rhythm, normal S1 and S2, no gallops, no murmurs, no rubs


ABDOMEN: Soft, nontender.  No hepatosplenomegaly, normal bowel sounds, no 

guarding or rigidity.


EXTREMITIES: No clubbing, no edema, no cyanosis, 2+ pulses and upper and lower 

extremities.


MUSCULOSKELETAL: Muscle strength and tone normal.








- Labs


CBC & Chem 7: 


                                 02/05/21 06:08





                                 02/05/21 06:08


Labs: 


                  Abnormal Lab Results - Last 24 Hours (Table)











  02/04/21 02/04/21 02/04/21 Range/Units





  15:59 17:25 23:45 


 


WBC  16.3 H    (3.8-10.6)  k/uL


 


Hct     (39.6-50.0)  %


 


MCHC     (32.0-37.0)  g/dL


 


Immature Gran #     (0.00-0.04)  X 10*3/uL


 


Neutrophils #  14.1 H    (1.3-7.7)  k/uL


 


Lymphocytes #  0.9 L    (1.0-4.8)  k/uL


 


Eosinophils #     (0.04-0.35)  X 10*3/uL


 


D-Dimer     (<0.60)  mg/L FEU


 


Sodium     (135-145)  mmol/L


 


Chloride     ()  mmol/L


 


Carbon Dioxide     (21.6-31.8)  mmol/L


 


BUN     (9.0-27.0)  mg/dL


 


Est GFR (CKD-EPI)AfAm     (60.0-200.0)   


 


Est GFR (CKD-EPI)NonAf     (60.0-200.0)   


 


BUN/Creatinine Ratio     (12.00-20.00)  Ratio


 


Glucose     ()  mg/dL


 


POC Glucose (mg/dL)   156 H  186 H  (75-99)  mg/dL


 


Lactate Dehydrogenase     (120-246)  U/L














  02/05/21 02/05/21 02/05/21 Range/Units





  05:30 06:08 06:08 


 


WBC   14.02 H   (3.8-10.6)  k/uL


 


Hct   50.7 H   (39.6-50.0)  %


 


MCHC   31.2 L   (32.0-37.0)  g/dL


 


Immature Gran #   0.12 H   (0.00-0.04)  X 10*3/uL


 


Neutrophils #   12.01 H   (1.3-7.7)  k/uL


 


Lymphocytes #     (1.0-4.8)  k/uL


 


Eosinophils #   0 L   (0.04-0.35)  X 10*3/uL


 


D-Dimer    0.62 H  (<0.60)  mg/L FEU


 


Sodium     (135-145)  mmol/L


 


Chloride     ()  mmol/L


 


Carbon Dioxide     (21.6-31.8)  mmol/L


 


BUN     (9.0-27.0)  mg/dL


 


Est GFR (CKD-EPI)AfAm     (60.0-200.0)   


 


Est GFR (CKD-EPI)NonAf     (60.0-200.0)   


 


BUN/Creatinine Ratio     (12.00-20.00)  Ratio


 


Glucose     ()  mg/dL


 


POC Glucose (mg/dL)  203 H    (75-99)  mg/dL


 


Lactate Dehydrogenase     (120-246)  U/L














  02/05/21 02/05/21 Range/Units





  06:08 11:58 


 


WBC    (3.8-10.6)  k/uL


 


Hct    (39.6-50.0)  %


 


MCHC    (32.0-37.0)  g/dL


 


Immature Gran #    (0.00-0.04)  X 10*3/uL


 


Neutrophils #    (1.3-7.7)  k/uL


 


Lymphocytes #    (1.0-4.8)  k/uL


 


Eosinophils #    (0.04-0.35)  X 10*3/uL


 


D-Dimer    (<0.60)  mg/L FEU


 


Sodium  147 H   (135-145)  mmol/L


 


Chloride  114 H   ()  mmol/L


 


Carbon Dioxide  21.5 L   (21.6-31.8)  mmol/L


 


BUN  87.0 H   (9.0-27.0)  mg/dL


 


Est GFR (CKD-EPI)AfAm  48.2 L   (60.0-200.0)   


 


Est GFR (CKD-EPI)NonAf  41.6 L   (60.0-200.0)   


 


BUN/Creatinine Ratio  58.00 H   (12.00-20.00)  Ratio


 


Glucose  211 H   ()  mg/dL


 


POC Glucose (mg/dL)   180 H  (75-99)  mg/dL


 


Lactate Dehydrogenase  300 H   (120-246)  U/L














Assessment and Plan


Assessment: 


-Acute respiratory failure secondary to covid 19 pneumonia and patient is 

maintained on IV Solu-Medrol along with colchicine, vitamin C and D supplements 

along with zinc supplements, and Lovenox and to continue.  Weaning FiO2 as 

tolerated and remains on 8 L high flow and oxygen saturation is 91%.  Pulmonary 

following 


-Coronary artery disease status post coronary artery bypass grafting, stents


-CVA/TIA in the past 


-gastroesophageal reflux disease


-hyperlipidemia


-Hypertension


-Depression


-Seizure disorder for which patient is on Keppra which will be continued


-DVT prophylaxis with Lovenox


-GI prophylaxis with Pepcid


-Full code





Plan:


Continue with current medication regimen.  Continue to wean FiO2 as tolerated.  

PT/OT following.  Discussed with wife Yovana Ayala about CODE STATUS and treatment 

plan and she would like to discuss with her children as she states they have 

never really discussed CODE STATUS previously and she knows patient would not 

want to be on a mechanical ventilator.  Will follow-up with family tomorrow 

about CODE STATUS.  Will repeat a.m. labs and monitor inflammatory markers.

## 2021-02-05 NOTE — PN
PROGRESS NOTE



DATE OF SERVICE:

02/05/2021



REASON FOR FOLLOWUP:

COVID-19 infection.



INTERVAL HISTORY:

The patient is currently afebrile. The patient is breathing slightly comfortably. Still

complaining of feeling very tired, with no energy.  No chest pain.  Did have some

cough, not bringing any sputum.  No vomiting or diarrhea.



PHYSICAL EXAMINATION:

Blood pressure is 105/67, pulse of 51, temperature 97.6.  He is 94% on 8 L nasal

cannula.

General description is an elderly male lying in bed in no distress.

RESPIRATORY SYSTEM: Unlabored breathing with decreased breath sounds at the base. No

wheeze.

HEART: S1, S2.  Regular rate and rhythm.

ABDOMEN: Soft. No tenderness.



LABS:

Hemoglobin 16.2, white count 14.02. BUN of 87, creatinine 1.5.



DIAGNOSTIC IMPRESSION AND PLAN:

Patient with acute COVID-19 infection in this patient currently on colchicine, Lovenox,

Solu-Medrol, zinc and respiratory support; to continue. Family at the bedside. Their

questions and concerns were answered.





MMODL / IJN: [id number] / Job#: [id number]

## 2021-02-06 LAB
GLUCOSE BLD-MCNC: 116 MG/DL (ref 75–99)
GLUCOSE BLD-MCNC: 137 MG/DL (ref 75–99)
GLUCOSE BLD-MCNC: 152 MG/DL (ref 75–99)
GLUCOSE BLD-MCNC: 220 MG/DL (ref 75–99)

## 2021-02-06 RX ADMIN — METHYLPREDNISOLONE SODIUM SUCCINATE SCH MG: 125 INJECTION, POWDER, FOR SOLUTION INTRAMUSCULAR; INTRAVENOUS at 06:07

## 2021-02-06 RX ADMIN — ALBUTEROL SULFATE PRN PUFF: 90 AEROSOL, METERED RESPIRATORY (INHALATION) at 08:11

## 2021-02-06 RX ADMIN — Medication SCH MG: at 08:32

## 2021-02-06 RX ADMIN — INSULIN ASPART SCH UNIT: 100 INJECTION, SOLUTION INTRAVENOUS; SUBCUTANEOUS at 06:07

## 2021-02-06 RX ADMIN — COLCHICINE SCH: 0.6 TABLET, FILM COATED ORAL at 20:43

## 2021-02-06 RX ADMIN — ISOSORBIDE MONONITRATE SCH MG: 30 TABLET, EXTENDED RELEASE ORAL at 08:32

## 2021-02-06 RX ADMIN — TAMSULOSIN HYDROCHLORIDE SCH: 0.4 CAPSULE ORAL at 20:43

## 2021-02-06 RX ADMIN — INSULIN ASPART SCH UNIT: 100 INJECTION, SOLUTION INTRAVENOUS; SUBCUTANEOUS at 11:53

## 2021-02-06 RX ADMIN — FAMOTIDINE SCH MG: 20 TABLET, FILM COATED ORAL at 08:32

## 2021-02-06 RX ADMIN — ATORVASTATIN CALCIUM SCH: 10 TABLET, FILM COATED ORAL at 20:43

## 2021-02-06 RX ADMIN — ASPIRIN 81 MG CHEWABLE TABLET SCH MG: 81 TABLET CHEWABLE at 08:32

## 2021-02-06 RX ADMIN — COLCHICINE SCH MG: 0.6 TABLET, FILM COATED ORAL at 08:32

## 2021-02-06 RX ADMIN — INSULIN ASPART SCH: 100 INJECTION, SOLUTION INTRAVENOUS; SUBCUTANEOUS at 23:36

## 2021-02-06 RX ADMIN — Medication SCH MCG: at 08:32

## 2021-02-06 RX ADMIN — CLOPIDOGREL BISULFATE SCH MG: 75 TABLET ORAL at 08:32

## 2021-02-06 RX ADMIN — MONTELUKAST SODIUM SCH: 10 TABLET, FILM COATED ORAL at 20:43

## 2021-02-06 RX ADMIN — METHYLPREDNISOLONE SODIUM SUCCINATE SCH MG: 125 INJECTION, POWDER, FOR SOLUTION INTRAMUSCULAR; INTRAVENOUS at 11:53

## 2021-02-06 RX ADMIN — Medication SCH: at 20:43

## 2021-02-06 RX ADMIN — TAMSULOSIN HYDROCHLORIDE SCH MG: 0.4 CAPSULE ORAL at 08:32

## 2021-02-06 RX ADMIN — ALBUTEROL SULFATE PRN PUFF: 90 AEROSOL, METERED RESPIRATORY (INHALATION) at 12:07

## 2021-02-06 RX ADMIN — OXYCODONE HYDROCHLORIDE AND ACETAMINOPHEN SCH MG: 500 TABLET ORAL at 08:32

## 2021-02-06 RX ADMIN — METHYLPREDNISOLONE SODIUM SUCCINATE SCH MG: 125 INJECTION, POWDER, FOR SOLUTION INTRAMUSCULAR; INTRAVENOUS at 17:30

## 2021-02-06 RX ADMIN — ENOXAPARIN SODIUM SCH MG: 30 INJECTION SUBCUTANEOUS at 08:32

## 2021-02-06 RX ADMIN — METHYLPREDNISOLONE SODIUM SUCCINATE SCH MG: 125 INJECTION, POWDER, FOR SOLUTION INTRAMUSCULAR; INTRAVENOUS at 23:36

## 2021-02-06 RX ADMIN — VENLAFAXINE HYDROCHLORIDE SCH MG: 150 CAPSULE, EXTENDED RELEASE ORAL at 08:32

## 2021-02-06 RX ADMIN — EZETIMIBE SCH: 10 TABLET ORAL at 20:43

## 2021-02-06 RX ADMIN — INSULIN ASPART SCH UNIT: 100 INJECTION, SOLUTION INTRAVENOUS; SUBCUTANEOUS at 17:30

## 2021-02-06 NOTE — PN
PROGRESS NOTE



DATE OF SERVICE:

02/06/2021



REASON FOR FOLLOWUP:

COVID-19 infection.



INTERVAL HISTORY:

Patient is currently afebrile.  The patient is hemodynamically stable.  He is breathing

comfortably currently on nasal cannula oxygen.  However, the patient was noticed to be

satting around 95% as the patient has taken his oxygen off.  No vomiting or diarrhea

reported by the nursing staff.



PHYSICAL EXAMINATION:

Blood pressure 111/66, pulse of 93, temperature 97.6.  He was 91% on room air.  General

description is an elderly male lying in bed in no distress.  Respiratory system:

Unlabored breathing with decreased breath sounds at the bases, no wheeze.  Heart S1,

S2.  Regular rate and rhythm.  Abdomen soft, no tenderness.



LABS:

No new labs have been obtained today.



DIAGNOSTIC IMPRESSION AND PLAN:

Patient with acute COVID-19 infection currently managed with Lovenox, Solu-Medrol,

colchicine and zinc, to continue.  Solu-Medrol can be slowly weaned off as the patient

has overall clinical improvement and continue supportive care.





MMODL / IJN: [id number] / Job#: [id number]

## 2021-02-07 VITALS — HEART RATE: 76 BPM | RESPIRATION RATE: 20 BRPM | DIASTOLIC BLOOD PRESSURE: 65 MMHG | SYSTOLIC BLOOD PRESSURE: 117 MMHG

## 2021-02-07 VITALS — TEMPERATURE: 96.7 F

## 2021-02-07 LAB
ANION GAP SERPL CALC-SCNC: 21 MMOL/L
BUN SERPL-SCNC: 151 MG/DL (ref 9–20)
CALCIUM SPEC-MCNC: 10.1 MG/DL (ref 8.4–10.2)
CELLS COUNTED: 100
CHLORIDE SERPL-SCNC: 119 MMOL/L (ref 98–107)
CO2 SERPL-SCNC: 17 MMOL/L (ref 22–30)
ERYTHROCYTE [DISTWIDTH] IN BLOOD BY AUTOMATED COUNT: 6.07 M/UL (ref 4.3–5.9)
ERYTHROCYTE [DISTWIDTH] IN BLOOD: 13.8 % (ref 11.5–15.5)
FERRITIN SERPL-MCNC: 1640.9 NG/ML (ref 22–322)
GLUCOSE BLD-MCNC: 115 MG/DL (ref 75–99)
GLUCOSE BLD-MCNC: 180 MG/DL (ref 75–99)
GLUCOSE SERPL-MCNC: 202 MG/DL (ref 74–99)
HCT VFR BLD AUTO: 56.5 % (ref 39–53)
HGB BLD-MCNC: 18 GM/DL (ref 13–17.5)
LDH SPEC-CCNC: 930 U/L (ref 313–618)
LYMPHOCYTES # BLD MANUAL: 2.17 K/UL (ref 1–4.8)
MCH RBC QN AUTO: 29.6 PG (ref 25–35)
MCHC RBC AUTO-ENTMCNC: 31.8 G/DL (ref 31–37)
MCV RBC AUTO: 93.1 FL (ref 80–100)
MONOCYTES # BLD MANUAL: 0.54 K/UL (ref 0–1)
NEUTROPHILS NFR BLD MANUAL: 88 %
NEUTS SEG # BLD MANUAL: 24.3 K/UL (ref 1.3–7.7)
PLATELET # BLD AUTO: 265 K/UL (ref 150–450)
POTASSIUM SERPL-SCNC: 4.6 MMOL/L (ref 3.5–5.1)
SODIUM SERPL-SCNC: 157 MMOL/L (ref 137–145)
WBC # BLD AUTO: 27.1 K/UL (ref 3.8–10.6)

## 2021-02-07 PROCEDURE — 5A0935A ASSISTANCE WITH RESPIRATORY VENTILATION, LESS THAN 24 CONSECUTIVE HOURS, HIGH NASAL FLOW/VELOCITY: ICD-10-PCS

## 2021-02-07 RX ADMIN — OXYCODONE HYDROCHLORIDE AND ACETAMINOPHEN SCH: 500 TABLET ORAL at 08:07

## 2021-02-07 RX ADMIN — ENOXAPARIN SODIUM SCH: 30 INJECTION SUBCUTANEOUS at 08:08

## 2021-02-07 RX ADMIN — ISOSORBIDE MONONITRATE SCH: 30 TABLET, EXTENDED RELEASE ORAL at 08:08

## 2021-02-07 RX ADMIN — INSULIN ASPART SCH: 100 INJECTION, SOLUTION INTRAVENOUS; SUBCUTANEOUS at 12:38

## 2021-02-07 RX ADMIN — FAMOTIDINE SCH: 20 TABLET, FILM COATED ORAL at 08:08

## 2021-02-07 RX ADMIN — TAMSULOSIN HYDROCHLORIDE SCH: 0.4 CAPSULE ORAL at 08:08

## 2021-02-07 RX ADMIN — CLOPIDOGREL BISULFATE SCH: 75 TABLET ORAL at 08:08

## 2021-02-07 RX ADMIN — METHYLPREDNISOLONE SODIUM SUCCINATE SCH MG: 125 INJECTION, POWDER, FOR SOLUTION INTRAMUSCULAR; INTRAVENOUS at 05:58

## 2021-02-07 RX ADMIN — METHYLPREDNISOLONE SODIUM SUCCINATE SCH MG: 125 INJECTION, POWDER, FOR SOLUTION INTRAMUSCULAR; INTRAVENOUS at 13:33

## 2021-02-07 RX ADMIN — ASPIRIN 81 MG CHEWABLE TABLET SCH: 81 TABLET CHEWABLE at 08:07

## 2021-02-07 RX ADMIN — COLCHICINE SCH: 0.6 TABLET, FILM COATED ORAL at 08:08

## 2021-02-07 RX ADMIN — INSULIN ASPART SCH: 100 INJECTION, SOLUTION INTRAVENOUS; SUBCUTANEOUS at 16:04

## 2021-02-07 RX ADMIN — INSULIN ASPART SCH UNIT: 100 INJECTION, SOLUTION INTRAVENOUS; SUBCUTANEOUS at 05:58

## 2021-02-07 RX ADMIN — Medication SCH: at 08:07

## 2021-02-07 RX ADMIN — Medication SCH: at 08:08

## 2021-02-07 RX ADMIN — VENLAFAXINE HYDROCHLORIDE SCH: 150 CAPSULE, EXTENDED RELEASE ORAL at 08:09

## 2021-02-07 NOTE — P.PN
Subjective


Progress Note Date: 02/07/21





Assessment is 86 years old gentleman was initially transferred on January 28 

from [facility] with the positive Covid infection for Actemra 

infusion.  Apparently patient was on room air when initially diagnosed with 

Covid infection on January 19 and was discharged home.  He came to ER on January 22 and again in 27.  His chest x-ray was normal initially but 127.  On to have 

significant bilateral interstitial opacities.  Patient was found to be hypoxic 

and requiring 15 L by nasal cannula.  Patient was provided supportive care with 

not much in improvement.  He was found to be requiring high oxygen via nasal 

cannula along with a nonrebreather mask to help with oxygenation.  Over the 

period of part-time patient was also noted to be confused and restless.  

Patient's family requested care to be transferred to our service today. 


On evaluation today, patient is found to be confused and unable to hold a 

conversation.  He has multiple bouts of diarrhea.  He is unable to comprehend.  

No episodes of fever or chills noted in the past 24 hours.  Spoke to the 

patient's family that bedside who states patient is very active at baseline and 

has no underlying dementia.  On discussion with the family, decision was made to

switch patient's care to hospice and to keep patient comfortable as patient has 

not made any progress for the past week with worsening hypoxia and mental 

status.  I spoke with pulmonary who agrees with switching the patient to 

hospice.  On evaluation patient's blood work today WBC increased to 27 

hemoglobin 18 platelet 265 d-dimer elevated at 1.95 sodium 157 chloride 119 

bicarb 17  creatinine 4 proBNP of 3960 .  Ativan 0.5 mg was given 

to help with respiratory status and patient's underlying restlessness and 

agitation.  Ativan 1 mg every 6 hours.  Morphine 2 mg every 15 minutes to help 

with pain.  Comfort medical measures are added .  CODE STATUS switch to comfort 

care 





Objective





- Vital Signs


Vital signs: 


                                   Vital Signs











Temp  96.7 F L  02/07/21 13:56


 


Pulse  76   02/07/21 13:56


 


Resp  20   02/07/21 13:56


 


BP  117/65   02/07/21 13:56


 


Pulse Ox  87 L  02/07/21 05:33








                                 Intake & Output











 02/06/21 02/07/21 02/07/21





 18:59 06:59 18:59


 


Output Total  1 


 


Balance  -1 


 


Weight 69 kg  


 


Output:   


 


  Stool  1 


 


Other:   


 


  Voiding Method Bedside Commode Bedside Commode Bedside Commode


 


  # Voids  2 


 


  # Bowel Movements 4 1 














- Exam





- Constitutional


General appearance: Confused, uncooperative restless open eyes on command, 

delayed response, cachectic





- EENT


Eyes: anicteric sclerae, PERRLA, normal appearance


ENT: hearing grossly normal





- Neck


Neck: no lymphadenopathy, normal ROM, no other, no rigidity, no stridor, no 

thyromegaly





- Respiratory


Respiratory: bilateral diminished airways with crackles in all lobules





- Cardiovascular


Rhythm: Tachycardic 


normal: S1, S2


Abnormal Heart Sounds: no systolic murmur, no diastolic murmur, no rub, no S3 

Gallop, no S4 Gallop, no click, no other





- Gastrointestinal


General gastrointestinal: normal bowel sounds, soft nontender





- Integumentary


Integumentary: no rash





- Neurologic


Neurologic: Confused and unable to participate with exam, gait not assessed





- Musculoskeletal


Musculoskeletal: gait not assessed, strength equal bilaterally





- Psychiatric


Psychiatric: Confused, agitated, bilateral upper extremity restraints due to 

agitation





- Labs


CBC & Chem 7: 


                                 02/07/21 10:51





                                 02/07/21 10:51


Labs: 


                  Abnormal Lab Results - Last 24 Hours (Table)











  02/06/21 02/06/21 02/07/21 Range/Units





  16:52 23:26 05:52 


 


WBC     (3.8-10.6)  k/uL


 


RBC     (4.30-5.90)  m/uL


 


Hgb     (13.0-17.5)  gm/dL


 


Hct     (39.0-53.0)  %


 


Neutrophils # (Manual)     (1.3-7.7)  k/uL


 


D-Dimer     (<0.60)  mg/L FEU


 


Sodium     (137-145)  mmol/L


 


Chloride     ()  mmol/L


 


Carbon Dioxide     (22-30)  mmol/L


 


BUN     (9-20)  mg/dL


 


Creatinine     (0.66-1.25)  mg/dL


 


Glucose     (74-99)  mg/dL


 


POC Glucose (mg/dL)  137 H  116 H  180 H  (75-99)  mg/dL


 


Lactate Dehydrogenase     (313-618)  U/L














  02/07/21 02/07/21 02/07/21 Range/Units





  10:51 10:51 10:51 


 


WBC  27.1 H    (3.8-10.6)  k/uL


 


RBC  6.07 H    (4.30-5.90)  m/uL


 


Hgb  18.0 H    (13.0-17.5)  gm/dL


 


Hct  56.5 H    (39.0-53.0)  %


 


Neutrophils # (Manual)  24.30 H    (1.3-7.7)  k/uL


 


D-Dimer   1.92 H   (<0.60)  mg/L FEU


 


Sodium    157 H  (137-145)  mmol/L


 


Chloride    119 H  ()  mmol/L


 


Carbon Dioxide    17 L  (22-30)  mmol/L


 


BUN    151 H*  (9-20)  mg/dL


 


Creatinine    4.01 H  (0.66-1.25)  mg/dL


 


Glucose    202 H  (74-99)  mg/dL


 


POC Glucose (mg/dL)     (75-99)  mg/dL


 


Lactate Dehydrogenase    930 H  (313-618)  U/L














  02/07/21 Range/Units





  11:49 


 


WBC   (3.8-10.6)  k/uL


 


RBC   (4.30-5.90)  m/uL


 


Hgb   (13.0-17.5)  gm/dL


 


Hct   (39.0-53.0)  %


 


Neutrophils # (Manual)   (1.3-7.7)  k/uL


 


D-Dimer   (<0.60)  mg/L FEU


 


Sodium   (137-145)  mmol/L


 


Chloride   ()  mmol/L


 


Carbon Dioxide   (22-30)  mmol/L


 


BUN   (9-20)  mg/dL


 


Creatinine   (0.66-1.25)  mg/dL


 


Glucose   (74-99)  mg/dL


 


POC Glucose (mg/dL)  115 H  (75-99)  mg/dL


 


Lactate Dehydrogenase   (313-618)  U/L














Assessment and Plan


Plan: 





#1 acute metabolic encephalopathy secondary to Covid pneumonia.  Worsening 

mental status secondary to Covid infection, dehydration


#2 acute hypoxic respiratory failure secondary to Covid 19 pneumonia was 

maintained on steroids, prophylactic dose of Lovenox, vitamin C, D and zinc 

supplement.  She was not a candidate of REM to severe and could not get 

Tocilizumab infusion.  Respiratory status continues to decline continues on 15 L

high flow with nonrebreather.  Comfort care initiated to help with respiratory 

uterus data his morphine and Ativan orders are placed


#3 acute sepsis secondary to Covid pneumonia on supportive care.  We will hold 

steroids, Lovenox, vitamin C and vitamin D as patient is currently on comfort 

measures


#4 acute kidney injury with ATN with dehydration.  Patient has poor oral intake 

and IV line could not be maintained due to patient's mental status.  Comfort 

care diet ordered


#5 coronary artery disease status post bypass grafting and stenting.  Comfort 

medications ordered.  


#6 difficulty swallowing.  Hold oral medications.  Comfort diet ordered 


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


#8 hypertension on blood pressure medication as patient is unable to swallow 

pills


#9 hyperlipidemia hold atorvastatin


#10 seizure disorder continue Keppra


#11 CODE STATUS no code.  Detailed discussion with family made patient is to 

hospice for comfort care

## 2021-02-07 NOTE — P.PN
Subjective


Principal diagnosis: 





COVID 19 pneumonia


02/05/2021 


patient is seen and evaluated in follow-up on medical floor, he appears to be 

pretty lethargic, he remains on high flow oxygen, currently at 8 L oxygen is 90-

93%, hemodynamically has been stable, has been afebrile, clinically seems to be 

very fatigued, worn out, he continues on high-dose IV Solu-Medrol, Lovenox, 

patient was out of the window for Remdesivir, this facility is not using Actemra

for COVID 19 pneumonitis.  Patient continues to have shortness of breath, he is 

been mostly bedbound.  No nausea vomiting or diarrhea, his labs have been 

reviewed, d-dimer 0.62, creatinine is 1.5,  which has improved since 

admission.  White blood cell count is 14, hemoglobin is 15.8


Patient will remain on current dose Lovenox, continue IV Solu-Medrol, continue 

vitamin C, D and zinc, will continue supportive treatment.  Clinically patient 

appears to be very weak, debilitated.  Titrate FiO2 to maintain O2 sat at or 

above 90%, may place on BiPAP support if develops respiratory fatigue.  Overall 

prognosis is very guarded.  Patient's family is expected to come in and discuss 

possibility of hospice. 





02/06/2021


Patient is seen and evaluated for follow-up; patient remains afebrile with a 

temperature of 97.6, pulse 93, blood pressure 111/66, SpO2 of 91%; patient is 

breathing comfortably on nasal cannula; no reports of vomiting or diarrhea


Patient remains in special precautions for acute Covid 19 infection; currently 

remains on Covid 19 treatment bundle with Lovenox, IV Solu-Medrol, zinc and 

vitamins; we plan to slowly start weaning IV Solu-Medrol if agreeable with 

pulmonary service





Objective





- Vital Signs


Vital signs: 


                                   Vital Signs











Temp  97.4 F L  02/06/21 09:44


 


Pulse  111 H  02/06/21 09:44


 


Resp  20   02/06/21 09:44


 


BP  118/65   02/06/21 09:44


 


Pulse Ox  93 L  02/06/21 09:44








                                 Intake & Output











 02/05/21 02/06/21 02/06/21





 18:59 06:59 18:59


 


Weight   69 kg


 


Other:   


 


  Voiding Method Bedside Commode Bedside Commode Bedside Commode


 


  # Voids 7 2 


 


  # Bowel Movements 4  1














- Exam


GENERAL EXAM: fatigued, worn out, lethargic, 86 old white male, on 8 L of oxygen

a pulse ox between 91-96% appears very weak and debilitated resting quietly in 

bed 


HEAD: Normocephalic/atraumatic.


EYES: Normal reaction of pupils, equal size.  Conjunctiva pink, sclera white.


NOSE: Clear with pink turbinates.


THROAT: No erythema or exudates.


NECK: No masses, no JVD, no thyroid enlargement, no adenopathy.


CHEST: No chest wall deformity.  Symmetrical expansion. 


LUNGS: Equal air entry with no crackles, wheeze, rhonchi or dullness.


CVS: Regular rate and rhythm, normal S1 and S2, no gallops, no murmurs, no rubs


ABDOMEN: Soft, nontender.  No hepatosplenomegaly, normal bowel sounds, no 

guarding or rigidity.


EXTREMITIES: No clubbing, no edema, no cyanosis, 2+ pulses and upper and lower 

extremities.


MUSCULOSKELETAL: Muscle strength and tone normal.








- Labs


CBC & Chem 7: 


                                 02/05/21 06:08





                                 02/05/21 06:08


Labs: 


                  Abnormal Lab Results - Last 24 Hours (Table)











  02/05/21 02/05/21 02/06/21 Range/Units





  16:51 23:33 06:03 


 


POC Glucose (mg/dL)  162 H  179 H  152 H  (75-99)  mg/dL














  02/06/21 Range/Units





  11:45 


 


POC Glucose (mg/dL)  220 H  (75-99)  mg/dL














Assessment and Plan


Assessment: 


-Acute respiratory failure secondary to covid 19 pneumonia and patient is 

maintained on IV Solu-Medrol along with colchicine, vitamin C and D supplements 

along with zinc supplements, and Lovenox and to continue.  Weaning FiO2 as 

tolerated and remains on 8 L high flow and oxygen saturation is 91%.  Pulmonary 

following 


-Coronary artery disease status post coronary artery bypass grafting, stents


-CVA/TIA in the past 


-gastroesophageal reflux disease


-hyperlipidemia


-Hypertension


-Depression


-Seizure disorder for which patient is on Keppra which will be continued


-DVT prophylaxis with Lovenox


-GI prophylaxis with Pepcid


-Full code





Plan:


Continue with current medication regimen.  Continue to wean FiO2 as tolerated.  

PT/OT following.  Discussed with wife Yovana Ayala about CODE STATUS and treatment 

plan and she would like to discuss with her children as she states they have 

never really discussed CODE STATUS previously and she knows patient would not 

want to be on a mechanical ventilator.  Will follow-up with family tomorrow 

about CODE STATUS.  Will repeat a.m. labs and monitor inflammatory markers.

## 2021-02-10 NOTE — P.DS
Providers


Date of admission: 


21 20:19





Expected date of discharge: 21


Attending physician: 


Virgilio Oh MD





Consults: 





                                        





21 23:57


Consult Physician Routine 


   Consulting Provider: Gurmeet Adams


   Consult Reason/Comments: COVID ICU management


   Do you want consulting provider notified?: Already Contacted





21 17:50


Consult Physician Routine 


   Consulting Provider: Tomy Zurita


   Consult Reason/Comments: covid


   Do you want consulting provider notified?: Yes











Primary care physician: 


Stated None





Hospital Course: 





Assessment is 86 years old gentleman was initially transferred on  

from [facility] with the positive Covid infection for Actemra 

infusion.  Apparently patient was on room air when initially diagnosed with 

Covid infection on  and was discharged home.  He came to ER on  and again in .  His chest x-ray was normal initially but 127.  On to have 

significant bilateral interstitial opacities.  Patient was found to be hypoxic 

and requiring 15 L by nasal cannula.  Patient was provided supportive care with 

not much in improvement.  He was found to be requiring high oxygen via nasal 

cannula along with a nonrebreather mask to help with oxygenation.  Over the 

period of part-time patient was also noted to be confused and restless.  

Patient's family requested care to be transferred to our service today. 


On evaluation today, patient is found to be confused and unable to hold a 

conversation.  He has multiple bouts of diarrhea.  He is unable to comprehend.  

No episodes of fever or chills noted in the past 24 hours.  Spoke to the 

patient's family that bedside who states patient is very active at baseline and 

has no underlying dementia.  On discussion with the family, decision was made to

switch patient's care to hospice and to keep patient comfortable as patient has 

not made any progress for the past week with worsening hypoxia and mental 

status.  I spoke with pulmonary who agrees with switching the patient to 

hospice.  On evaluation patient's blood work today WBC increased to 27 

hemoglobin 18 platelet 265 d-dimer elevated at 1.95 sodium 157 chloride 119 

bicarb 17  creatinine 4 proBNP of 3960 .  Ativan 0.5 mg was given 

to help with respiratory status and patient's underlying restlessness and 

agitation.  Ativan 1 mg every 6 hours.  Morphine 2 mg every 15 minutes to help 

with pain.  Comfort medical measures are added .  CODE STATUS switch to comfort 

care 





: Patient  on .  Please see nursing recommendation for 

detail.





Diagnoses:


#1 acute metabolic encephalopathy secondary to Covid pneumonia.  


#2 acute hypoxic respiratory failure secondary to Covid 19 pneumonia 


#3 acute sepsis secondary to Covid pneumonia 


#4 acute kidney injury with ATN with dehydration.  


#5 coronary artery disease status post bypass grafting and stenting. 


#6 difficulty swallowing.  


#7 history of CVA/TIA in the past


#8 hypertension 


#9 hyperlipidemia 


#10 seizure disorder





Impression and plan of care have been directed as dictated by the signing 

physician.  Becky Ubrina nurse practitioner acting as scribe for signing 

physician.





Plan - Discharge Summary


Discharge Rx Participant: Yes


New Discharge Prescriptions: 


No Action


   Acetaminophen Tab [Tylenol] 650 mg PO Q4H PRN


     PRN Reason: Fever And/ Or Pain


   Albuterol Inhaler [Ventolin Hfa Inhaler] 2 puff INHALATION RT-Q4H PRN


     PRN Reason: Shortness Of Breath


   Ascorbic Acid [Vitamin C] 2,000 mg PO DAILY


   Aspirin EC [Ecotrin Low Dose] 81 mg PO DAILY


   Atorvastatin Calcium [Lipitor] 10 mg PO HS


   carvediloL [Coreg] 3.125 mg PO BID


   Cholecalciferol [Vitamin D3 (25 Mcg = 1000 Iu)] 100 mcg PO DAILY


   Clopidogrel Bisulfate [Plavix] 75 mg PO DAILY


   Colchicine 0.6 mg PO BID


   Enoxaparin [Lovenox] 40 mg SQ DAILY


   Ezetimibe [Zetia] 10 mg PO HS


   Famotidine [Pepcid] 20 mg PO DAILY


   Isosorbide Mononitrate ER [Imdur] 90 mg PO DAILY


   levETIRAcetam [Keppra] 500 mg PO Q12H


   lisinopriL [Zestril] 5 mg PO DAILY


   Melatonin 5 mg PO HS


   Montelukast [Singulair] 10 mg PO HS


   Sodium Chloride [Saline Nasal Spray] 1 spray EA NOSTRIL BID


   Tamsulosin [Flomax] 0.4 mg PO BID


   Thiamine [Vitamin B-1] 100 mg PO BID


   Venlafaxine HCl ER [Effexor Xr] 150 mg PO DAILY


   Zinc Sulfate 220 mg PO AC-BRKFST


Discharge Medication List





Acetaminophen Tab [Tylenol] 650 mg PO Q4H PRN 21 [History]


Albuterol Inhaler [Ventolin Hfa Inhaler] 2 puff INHALATION RT-Q4H PRN 21 

[History]


Ascorbic Acid [Vitamin C] 2,000 mg PO DAILY 21 [History]


Aspirin EC [Ecotrin Low Dose] 81 mg PO DAILY 21 [History]


Atorvastatin Calcium [Lipitor] 10 mg PO HS 21 [History]


Cholecalciferol [Vitamin D3 (25 Mcg = 1000 Iu)] 100 mcg PO DAILY 21 

[History]


Clopidogrel Bisulfate [Plavix] 75 mg PO DAILY 21 [History]


Colchicine 0.6 mg PO BID 21 [History]


Enoxaparin [Lovenox] 40 mg SQ DAILY 21 [History]


Ezetimibe [Zetia] 10 mg PO HS 21 [History]


Famotidine [Pepcid] 20 mg PO DAILY 21 [History]


Isosorbide Mononitrate ER [Imdur] 90 mg PO DAILY 21 [History]


Melatonin 5 mg PO HS 21 [History]


Montelukast [Singulair] 10 mg PO HS 21 [History]


Sodium Chloride [Saline Nasal Spray] 1 spray EA NOSTRIL BID 21 [History]


Tamsulosin [Flomax] 0.4 mg PO BID 21 [History]


Thiamine [Vitamin B-1] 100 mg PO BID 21 [History]


Venlafaxine HCl ER [Effexor Xr] 150 mg PO DAILY 21 [History]


Zinc Sulfate 220 mg PO AC-BRKFST 21 [History]


carvediloL [Coreg] 3.125 mg PO BID 21 [History]


levETIRAcetam [Keppra] 500 mg PO Q12H 21 [History]


lisinopriL [Zestril] 5 mg PO DAILY 21 [History]








Discharge Disposition: 





- Preliminary Cause of Death


Preliminary Cause of Death: COVID-19 Pneumonia

## 2021-02-10 NOTE — CDI
Documentation Clarification Form



Date: 02/10/2021 12:45:42 PM

From: Yovana EspinozaCastroPARISH meng, CCDS

Phone: [phone]

MRN: [id number]

Admit Date: 2021 08:19:00 PM

Patient Name: David Alston

Visit Number: [id number]

Discharge Date:  2021 08:30:00 PM





ATTENTION: The Clinical Documentation Specialists (CDI) and [facility] Coding Staff 
appreciate your assistance in clarifying documentation. Please respond to the 
clarification below the line at the bottom and electronically sign. The CDI & 
[facility] Coding staff will review the response and follow-up if needed. Please note: 
Queries are made part of the Legal Health Record. If you have any questions, 
please contact the author of this message via ITS.



Dr. Virgilio Oh:



Per the Discharge Summary Final Diagnosis, the following is documented:

Acute Metabolic Encephalopathy secondary to COVID Pneumonia.

Acute Hypoxic Respiratory Failure secondary to COVID Pneumonia

Acute Sepsis secondary to COVID pneumonia

ELIZABETH with ATN with dehydration

Patient  2021. 



History/Risk Factors per the History & Physical Past Medical History: CAD status
post Stent & CABG, Right Lung Lobectomy, Dysphagia, CVA/TIA, Hypertension, 
Hyperlipidemia, Seizure Disorder, GERD, Hyperlipidemia, Depression and Former 
smoker.

Clinical Indicators per the  History & Physical:  86-year-old pleasant 
gentleman was transferred from [facility] for COVID 19 and 
treatment with Actemra, which is unfortunately not available at this time at 
this hospital. Patient does not have any more fevers, is on 15 L of high flow 
nasal cannula oxygen. Patient has bilateral rhonchi. Patient is not a candidate 
for Remdesivir but patient is on Decadron, Colchicine, Zinc and Ascorbic acid. 
Patient denied any fever chills nausea vomiting abdominal pain or dysuria.

VS : 

LAB : WBC 9.9 - 11.2 (), Hgb 12.0, Neut 8.4 - 9.2 (), Lymph 0.7 - 
0.8 (), D Dimer 1.06, BUN 41, Creatinine 0.89, Glucose 185, Mag 2.8, LDH 
1229, CRP 27.0

 UA: colorless, clear, Moderate Blood, Negative Nitrite, RBC 15.

RAD: 1/30 CXR: Correlate for pneumonia, edema.  CXR: Findings consistent 
with patient's history of pneumonia.  CXR: Patchy perihilar & basilar 
infiltrates persist.  CXR: Patchy infiltrates. 2/3: Correlate for pneumonia.



Treatment : IV Lasix, po Tylenol, INH Ventolin, Vit C, Vit D3, IV Decadron, 
Lovenox sq, Orazinc, IV Solumedrol



For each diagnosis, documentation must be clear to determine if the condition 
was present at the time of the patients inpatient admission or developed during
the hospital stay.  



Please clarify if the following diagnoses were POA: 



    Acute Metabolic Encephalopathy

    Acute Sepsis with or without Severe Sepsis

    ELIZABETH with ATN



      ____Y = Yes, the condition was present at the time of the order for 
inpatient admission. 

      ____N = No, the condition was not present at the time of the order for 
inpatient admission. 

      ____W = Clinically undetermined if the condition was present at the time 
of the order for inpatient admission.





(Last Revision: Dec 2018)

___________________________w - patient was not on our service on admission, 
please query the admitting team for details 
________________________________________________

MTDD

## 2021-02-15 NOTE — CDI
Documentation Clarification Form



Date: 02/10/2021 12:45:00 PM

From: Yovana CastroPARISH meng, CCDS

Phone: [phone]

MRN: [id number]

Admit Date: 2021 08:19:00 PM

Patient Name: David Alston

Visit Number: [id number]

Discharge Date:  2021 08:30:00 PM





ATTENTION: The Clinical Documentation Specialists (CDI) and [facility] Coding Staff 
appreciate your assistance in clarifying documentation. Please respond to the 
clarification below the line at the bottom and electronically sign. The CDI & 
[facility] Coding staff will review the response and follow-up if needed. Please note: 
Queries are made part of the Legal Health Record. If you have any questions, 
please contact the author of this message via ITS.



Dr. Thais Hayward:



Per the Discharge Summary Final Diagnosis, the following is documented:

Acute Metabolic Encephalopathy secondary to COVID Pneumonia.

Acute Hypoxic Respiratory Failure secondary to COVID Pneumonia

Acute Sepsis secondary to COVID pneumonia

ELIZABETH with ATN with dehydration

Patient  2021. 



History/Risk Factors per the History & Physical Past Medical History: CAD status
post Stent & CABG, Right Lung Lobectomy, Dysphagia, CVA/TIA, Hypertension, 
Hyperlipidemia, Seizure Disorder, GERD, Hyperlipidemia, Depression and Former 
smoker.



Clinical Indicators per the  History & Physical:  86-year-old pleasant 
gentleman was transferred from [facility] for COVID 19 and 
treatment with Actemra, which is unfortunately not available at this time at 
this hospital. Patient does not have any more fevers, is on 15 L of high flow 
nasal cannula oxygen. Patient has bilateral rhonchi. Patient is not a candidate 
for Remdesivir but patient is on Decadron, Colchicine, Zinc and Ascorbic acid. 
Patient denied any fever chills nausea vomiting abdominal pain or dysuria.

VS : T 98.3, P 61 - 59; R 15 - 33 - 25 (Labored, SOB), /57, PO 92% on 
High Flow O2 - 80% on High Flow

LAB : WBC 9.9 - 11.2 (), Hgb 12.0, Neut 8.4 - 9.2 (), Lymph 0.7 - 
0.8 (), D Dimer 1.06, BUN 41, Creatinine 0.89, Glucose 185, Mag 2.8, LDH 
1229, CRP 27.0

Scanned COVID Test from the [facility] : Positive.

 UA: colorless, clear, Moderate Blood, Negative Nitrite, RBC 15.

RAD:  CXR: Correlate for pneumonia, edema.  CXR: Findings consistent 
with patient's history of pneumonia.  CXR: Patchy perihilar & basilar 
infiltrates persist.  CXR: Patchy infiltrates. 2/3: Correlate for pneumonia.



Treatment : IV Lasix, po Tylenol, INH Ventolin, Vit C, Vit D3, IV Decadron, 
Lovenox sq, Orazinc, IV Solumedrol



As the Admitting/Attending Physician, please clarify the Present on Admission 
status for each diagnosis, documentation must be clear to determine if the 
condition was present at the time of the patients inpatient admission or 
developed during the hospital stay.  



Please clarify if the following diagnoses were POA: 



    Acute Metabolic Encephalopathy

    Acute Sepsis with or without Severe Sepsis

    ELIZABETH with ATN



   ____Y = Yes, the condition was present at the time of the order for inpatient
admission. 

   ____N = No, the condition was not present at the time of the order for 
inpatient admission. 

   ____W = Clinically undetermined if the condition was present at the time of 
the order for inpatient admission.





(Last Revision: Dec 2018)

___________________________________________________________________________

 POA

MTDD